# Patient Record
Sex: MALE | Race: WHITE | NOT HISPANIC OR LATINO | Employment: FULL TIME | URBAN - METROPOLITAN AREA
[De-identification: names, ages, dates, MRNs, and addresses within clinical notes are randomized per-mention and may not be internally consistent; named-entity substitution may affect disease eponyms.]

---

## 2021-12-14 ENCOUNTER — TELEPHONE (OUTPATIENT)
Dept: FAMILY MEDICINE CLINIC | Facility: CLINIC | Age: 41
End: 2021-12-14

## 2021-12-14 ENCOUNTER — OFFICE VISIT (OUTPATIENT)
Dept: FAMILY MEDICINE CLINIC | Facility: CLINIC | Age: 41
End: 2021-12-14
Payer: COMMERCIAL

## 2021-12-14 VITALS
BODY MASS INDEX: 29.2 KG/M2 | SYSTOLIC BLOOD PRESSURE: 116 MMHG | WEIGHT: 204 LBS | HEIGHT: 70 IN | HEART RATE: 84 BPM | DIASTOLIC BLOOD PRESSURE: 82 MMHG

## 2021-12-14 DIAGNOSIS — N48.9 PENILE LESION: ICD-10-CM

## 2021-12-14 DIAGNOSIS — Z00.00 HEALTH CARE MAINTENANCE: ICD-10-CM

## 2021-12-14 DIAGNOSIS — Z72.0 NICOTINE ABUSE: Primary | ICD-10-CM

## 2021-12-14 DIAGNOSIS — Z72.0 NICOTINE ABUSE: ICD-10-CM

## 2021-12-14 PROCEDURE — 3008F BODY MASS INDEX DOCD: CPT | Performed by: PHYSICIAN ASSISTANT

## 2021-12-14 PROCEDURE — 3725F SCREEN DEPRESSION PERFORMED: CPT | Performed by: PHYSICIAN ASSISTANT

## 2021-12-14 PROCEDURE — 99204 OFFICE O/P NEW MOD 45 MIN: CPT | Performed by: PHYSICIAN ASSISTANT

## 2021-12-14 RX ORDER — VARENICLINE TARTRATE 25 MG
KIT ORAL
Qty: 53 TABLET | Refills: 0 | Status: SHIPPED | OUTPATIENT
Start: 2021-12-14 | End: 2021-12-15

## 2021-12-15 RX ORDER — VARENICLINE TARTRATE 1 MG/1
1 TABLET, FILM COATED ORAL 2 TIMES DAILY
Qty: 60 TABLET | Refills: 4 | Status: SHIPPED | OUTPATIENT
Start: 2021-12-15

## 2021-12-15 RX ORDER — VARENICLINE TARTRATE
KIT
Qty: 53 EACH | Refills: 0 | Status: SHIPPED | OUTPATIENT
Start: 2021-12-15 | End: 2021-12-15 | Stop reason: ALTCHOICE

## 2021-12-15 RX ORDER — VARENICLINE TARTRATE 1 MG/1
1 TABLET, FILM COATED ORAL 2 TIMES DAILY
Qty: 60 TABLET | Refills: 4 | Status: SHIPPED | OUTPATIENT
Start: 2021-12-15 | End: 2021-12-15

## 2021-12-18 LAB
ALBUMIN SERPL-MCNC: 4.4 G/DL (ref 3.6–5.1)
ALBUMIN/GLOB SERPL: 2 (CALC) (ref 1–2.5)
ALP SERPL-CCNC: 62 U/L (ref 36–130)
ALT SERPL-CCNC: 16 U/L (ref 9–46)
AST SERPL-CCNC: 14 U/L (ref 10–40)
B BURGDOR AB SER IA-ACNC: <0.9 INDEX
BASOPHILS # BLD AUTO: 83 CELLS/UL (ref 0–200)
BASOPHILS NFR BLD AUTO: 0.9 %
BILIRUB SERPL-MCNC: 0.6 MG/DL (ref 0.2–1.2)
BUN SERPL-MCNC: 15 MG/DL (ref 7–25)
BUN/CREAT SERPL: NORMAL (CALC) (ref 6–22)
CALCIUM SERPL-MCNC: 9.4 MG/DL (ref 8.6–10.3)
CHLORIDE SERPL-SCNC: 105 MMOL/L (ref 98–110)
CHOLEST SERPL-MCNC: 213 MG/DL
CHOLEST/HDLC SERPL: 5.3 (CALC)
CO2 SERPL-SCNC: 30 MMOL/L (ref 20–32)
CREAT SERPL-MCNC: 0.8 MG/DL (ref 0.6–1.35)
EOSINOPHIL # BLD AUTO: 147 CELLS/UL (ref 15–500)
EOSINOPHIL NFR BLD AUTO: 1.6 %
ERYTHROCYTE [DISTWIDTH] IN BLOOD BY AUTOMATED COUNT: 12.1 % (ref 11–15)
GLOBULIN SER CALC-MCNC: 2.2 G/DL (CALC) (ref 1.9–3.7)
GLUCOSE SERPL-MCNC: 88 MG/DL (ref 65–99)
HCT VFR BLD AUTO: 44.8 % (ref 38.5–50)
HDLC SERPL-MCNC: 40 MG/DL
HGB BLD-MCNC: 15.5 G/DL (ref 13.2–17.1)
LDLC SERPL CALC-MCNC: 147 MG/DL (CALC)
LYMPHOCYTES # BLD AUTO: 2456 CELLS/UL (ref 850–3900)
LYMPHOCYTES NFR BLD AUTO: 26.7 %
MCH RBC QN AUTO: 30.4 PG (ref 27–33)
MCHC RBC AUTO-ENTMCNC: 34.6 G/DL (ref 32–36)
MCV RBC AUTO: 87.8 FL (ref 80–100)
MONOCYTES # BLD AUTO: 598 CELLS/UL (ref 200–950)
MONOCYTES NFR BLD AUTO: 6.5 %
NEUTROPHILS # BLD AUTO: 5916 CELLS/UL (ref 1500–7800)
NEUTROPHILS NFR BLD AUTO: 64.3 %
NONHDLC SERPL-MCNC: 173 MG/DL (CALC)
PLATELET # BLD AUTO: 283 THOUSAND/UL (ref 140–400)
PMV BLD REES-ECKER: 10.5 FL (ref 7.5–12.5)
POTASSIUM SERPL-SCNC: 4.5 MMOL/L (ref 3.5–5.3)
PROT SERPL-MCNC: 6.6 G/DL (ref 6.1–8.1)
RBC # BLD AUTO: 5.1 MILLION/UL (ref 4.2–5.8)
SL AMB EGFR AFRICAN AMERICAN: 130 ML/MIN/1.73M2
SL AMB EGFR NON AFRICAN AMERICAN: 113 ML/MIN/1.73M2
SODIUM SERPL-SCNC: 141 MMOL/L (ref 135–146)
TRIGL SERPL-MCNC: 137 MG/DL
TSH SERPL-ACNC: 0.94 MIU/L (ref 0.4–4.5)
WBC # BLD AUTO: 9.2 THOUSAND/UL (ref 3.8–10.8)

## 2022-01-27 ENCOUNTER — OFFICE VISIT (OUTPATIENT)
Dept: UROLOGY | Facility: MEDICAL CENTER | Age: 42
End: 2022-01-27
Payer: COMMERCIAL

## 2022-01-27 VITALS
DIASTOLIC BLOOD PRESSURE: 80 MMHG | SYSTOLIC BLOOD PRESSURE: 132 MMHG | WEIGHT: 203 LBS | HEIGHT: 70 IN | BODY MASS INDEX: 29.06 KG/M2

## 2022-01-27 DIAGNOSIS — N48.9 PENILE LESION: ICD-10-CM

## 2022-01-27 PROCEDURE — 99244 OFF/OP CNSLTJ NEW/EST MOD 40: CPT | Performed by: UROLOGY

## 2022-01-27 PROCEDURE — 3008F BODY MASS INDEX DOCD: CPT | Performed by: UROLOGY

## 2022-01-27 RX ORDER — CEFAZOLIN SODIUM 2 G/50ML
2000 SOLUTION INTRAVENOUS ONCE
Status: CANCELLED | OUTPATIENT
Start: 2022-03-16 | End: 2022-01-27

## 2022-01-27 NOTE — PATIENT INSTRUCTIONS
HPV (Human Papillomavirus)   AMBULATORY CARE:   Human Papillomavirus (HPV)  is the most common infection spread by sexual contact  It can also be spread from a mother to her baby during delivery  HPV may cause oral and genital warts or tumors in your nose, mouth, throat, and lungs  HPV may also cause vaginal, penile, and anal cancers  You may not show symptoms of any of these conditions for several years after being exposed to HPV  Common symptoms include the following:   · Painless warts    · Genital or anal discharge, bleeding, itching, or pain    · Pain when you urinate    Call your doctor if:   · You have warts in your genital or anal area  · You have genital or anal discharge, bleeding, itching, or pain  · You have pain when you urinate  · You have questions or concerns about your condition or care  HPV diagnosis:  Your healthcare provider may use a vinegar liquid to help diagnose HPV genital warts  Women 27to 72years old can be checked for HPV during regular cervical cancer screenings  An HPV test checks for certain types of HPV that can cause changes in cervical cells  Without treatment, the changed cells can become cancer  An HPV test can be done every 5 years if the results show no infection  The test can be done with or without a Pap smear  A Pap smear checks for cancer or for abnormal cells that can become cancer  You may be tested for HPV if you are diagnosed with a mouth or throat cancer  Treatment:  HPV cannot be cured  Conditions caused by HPV can be treated  You will need to be monitored closely for these conditions  Ask your healthcare provider for more information about monitoring, conditions caused by HPV, and available treatments  Prevent HPV infection:   · Ask about the HPV vaccine  The vaccine can help protect against HPV infection  Females and males can receive the vaccine  It is most effective if given before sexual activity begins   This allows the body to build almost complete protection against HPV before contact with the virus  The vaccine is usually given at 6or 15years of age but may be given as early as 5 years  The vaccine can be given through age 39  · Always use a condom during intercourse  A condom will not completely protect you from HPV infection, but it will help lower your risk  Use a new condom or latex barrier each time you have sex  This includes oral, vaginal, and anal sex  Make sure the condom fits and is put on correctly  Rubber latex sheets or dental dams can be used for oral sex  If you are allergic to latex, use a nonlatex product such as polyurethane  Follow up with your healthcare provider as directed:  Write down your questions so you remember to ask them during your visits  © Copyright [employer] 2021 Information is for End User's use only and may not be sold, redistributed or otherwise used for commercial purposes  All illustrations and images included in CareNotes® are the copyrighted property of A D A M , Inc  or [facility][note 3] Vivek Nino   The above information is an  only  It is not intended as medical advice for individual conditions or treatments  Talk to your doctor, nurse or pharmacist before following any medical regimen to see if it is safe and effective for you

## 2022-01-27 NOTE — PROGRESS NOTES
UROLOGY NEW CONSULT NOTE     CHIEF COMPLAINT   Peterson España is a 46 y o  male with a complaint of   Chief Complaint   Patient presents with    Penile Lesion       History of Present Illness:     46 y o  male who began to notice a penile lesion around age 23  Recently this has grown in size and has become somewhat unsightly  There was a raised area approximately 2 x 2 cm on the right penile shaft  Patient attempted apple cider vinegar treatments which did not reduce the size of the lesion  Presents today for discussion    Past Medical History:   History reviewed  No pertinent past medical history  PAST SURGICAL HISTORY:     Past Surgical History:   Procedure Laterality Date    KNEE SURGERY         CURRENT MEDICATIONS:     Current Outpatient Medications   Medication Sig Dispense Refill    varenicline (CHANTIX) 1 mg tablet Take 1 tablet (1 mg total) by mouth 2 (two) times a day 60 tablet 4     No current facility-administered medications for this visit         ALLERGIES:   No Known Allergies    SOCIAL HISTORY:     Social History     Socioeconomic History    Marital status: /Civil Union     Spouse name: None    Number of children: None    Years of education: None    Highest education level: None   Occupational History    None   Tobacco Use    Smoking status: Current Every Day Smoker     Packs/day: 0 50     Years: 10 00     Pack years: 5 00     Types: Cigarettes    Smokeless tobacco: Never Used   Vaping Use    Vaping Use: Every day    Substances: Nicotine, THC, Flavoring   Substance and Sexual Activity    Alcohol use: Yes     Comment: social    Drug use: Not Currently    Sexual activity: Yes   Other Topics Concern    None   Social History Narrative    None     Social Determinants of Health     Financial Resource Strain: Not on file   Food Insecurity: Not on file   Transportation Needs: Not on file   Physical Activity: Not on file   Stress: Not on file   Social Connections: Not on file Intimate Partner Violence: Not on file   Housing Stability: Not on file       SOCIAL HISTORY:     Family History   Problem Relation Age of Onset    Breast cancer additional onset Mother     Hyperlipidemia Father     Hypertension Father     Heart disease Maternal Grandmother     Stomach cancer Paternal Grandmother        REVIEW OF SYSTEMS:     Review of Systems   Constitutional: Negative  Respiratory: Negative  Cardiovascular: Negative  Gastrointestinal: Negative  Genitourinary: Negative  Musculoskeletal: Negative  Skin: Negative  Psychiatric/Behavioral: Negative  PHYSICAL EXAM:     /80   Ht 5' 10" (1 778 m)   Wt 92 1 kg (203 lb)   BMI 29 13 kg/m²     General:  Healthy appearing male in no acute distress  They have a normal affect  There is not appear to be any gross neurologic defects or abnormalities  HEENT:  Normocephalic, atraumatic  Neck is supple without any palpable lymphadenopathy  Cardiovascular:  Patient has normal palpable distal radial pulses  There is no significant peripheral edema  No JVD is noted  Respiratory:  Patient has unlabored respirations  There is no audible wheeze or rhonchi  Abdomen:  Abdomen is without surgical scars  Abdomen is soft and nontender  There is no tympany  Inguinal and umbilical hernia are not appreciated  Genitourinary:  Circumcised phallus, orthotopic meatus, testicles are smooth and descended, there is a 2 x 2 cm mid shaft area of condyloma which is slightly raised, testicles are smooth and descended in the scrotum there is a small skin tag on the scrotum on the right posterior surface    Musculoskeletal:  Patient does not have significant CVA tenderness in the  flank with palpation or percussion  They full range of motion in all 4 extremities  Strength in all 4 extremities appears congruent  Patient is able to ambulate without assistance or difficulty    Dermatologic:  Patient has no skin abnormalities or rashes  LABS:     CBC:   Lab Results   Component Value Date    WBC 9 2 12/17/2021    HGB 15 5 12/17/2021    HCT 44 8 12/17/2021    MCV 87 8 12/17/2021     12/17/2021       BMP:   Lab Results   Component Value Date    CALCIUM 9 4 12/17/2021    K 4 5 12/17/2021    CO2 30 12/17/2021     12/17/2021    BUN 15 12/17/2021    CREATININE 0 80 12/17/2021       ASSESSMENT:     46 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, penile condyloma and scrotal skin tag    PLAN:     I discussed with the patient that the lesion on his 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  Options for treatment include topical Podofilux versus excision and CO2 laser ablation in the operating room  Laser ablation is likely the most effective at removal and decreased recurrence  Patient understands that the HPV virus lays dormant and recurrences are possible  He prefers excision in the operating room  At that time, we will also remove the scrotal skin tag  Both will be sent for pathology  We will utilize a CO2 laser to ablate the base  Risks and benefits including bleeding and infection, recurrence of the lesion, poor cosmesis and change to sensation were all discussed    Patient will sign consent the day of the procedure

## 2022-01-28 ENCOUNTER — TELEPHONE (OUTPATIENT)
Dept: UROLOGY | Facility: CLINIC | Age: 42
End: 2022-01-28

## 2022-01-28 NOTE — TELEPHONE ENCOUNTER
Called patient and left a voicemail for patient to call me back to schedule their surgery  Patient to call me at [phone]

## 2022-02-02 NOTE — TELEPHONE ENCOUNTER
Called patient and left a voicemail for patient to call me back to schedule their surgery  Patient to call me at [phone]

## 2022-02-03 ENCOUNTER — PREP FOR PROCEDURE (OUTPATIENT)
Dept: UROLOGY | Facility: CLINIC | Age: 42
End: 2022-02-03

## 2022-02-03 DIAGNOSIS — N48.9 PENILE LESION: Primary | ICD-10-CM

## 2022-02-03 NOTE — TELEPHONE ENCOUNTER
Called and spoke with patient to schedule sx  Patient informed of all PAT's needed prior to sx and advised to stop any anticoagulant medication including Multi-vitamins, Fish oil, Krill oil and NSAID, Patient has also been inform that the hospital will call the day before sx with arrival time and procedure time   Patient also informed to have a  bring them to and from hospital     Sx Date: 03/16  Location: [facility]  Physician: Nithya Whatley  H&P Date:  On admit  Clearance Date:   Consent: on admit  Pre-cert Added to  list on : 02/03    PAT's Ordered to be done on: cbc/ bmp/ ucx 03/02/2022

## 2022-03-02 ENCOUNTER — APPOINTMENT (OUTPATIENT)
Dept: LAB | Facility: CLINIC | Age: 42
End: 2022-03-02
Payer: COMMERCIAL

## 2022-03-02 DIAGNOSIS — N48.9 PENILE LESION: ICD-10-CM

## 2022-03-02 LAB
ANION GAP SERPL CALCULATED.3IONS-SCNC: 6 MMOL/L (ref 4–13)
BASOPHILS # BLD AUTO: 0.08 THOUSANDS/ΜL (ref 0–0.1)
BASOPHILS NFR BLD AUTO: 1 % (ref 0–1)
BUN SERPL-MCNC: 16 MG/DL (ref 5–25)
CALCIUM SERPL-MCNC: 9 MG/DL (ref 8.3–10.1)
CHLORIDE SERPL-SCNC: 107 MMOL/L (ref 100–108)
CO2 SERPL-SCNC: 27 MMOL/L (ref 21–32)
CREAT SERPL-MCNC: 0.8 MG/DL (ref 0.6–1.3)
EOSINOPHIL # BLD AUTO: 0.11 THOUSAND/ΜL (ref 0–0.61)
EOSINOPHIL NFR BLD AUTO: 1 % (ref 0–6)
ERYTHROCYTE [DISTWIDTH] IN BLOOD BY AUTOMATED COUNT: 12 % (ref 11.6–15.1)
GFR SERPL CREATININE-BSD FRML MDRD: 112 ML/MIN/1.73SQ M
GLUCOSE P FAST SERPL-MCNC: 95 MG/DL (ref 65–99)
HCT VFR BLD AUTO: 47.6 % (ref 36.5–49.3)
HGB BLD-MCNC: 15.8 G/DL (ref 12–17)
IMM GRANULOCYTES # BLD AUTO: 0.02 THOUSAND/UL (ref 0–0.2)
IMM GRANULOCYTES NFR BLD AUTO: 0 % (ref 0–2)
LYMPHOCYTES # BLD AUTO: 2.59 THOUSANDS/ΜL (ref 0.6–4.47)
LYMPHOCYTES NFR BLD AUTO: 25 % (ref 14–44)
MCH RBC QN AUTO: 30 PG (ref 26.8–34.3)
MCHC RBC AUTO-ENTMCNC: 33.2 G/DL (ref 31.4–37.4)
MCV RBC AUTO: 91 FL (ref 82–98)
MONOCYTES # BLD AUTO: 0.71 THOUSAND/ΜL (ref 0.17–1.22)
MONOCYTES NFR BLD AUTO: 7 % (ref 4–12)
NEUTROPHILS # BLD AUTO: 6.7 THOUSANDS/ΜL (ref 1.85–7.62)
NEUTS SEG NFR BLD AUTO: 66 % (ref 43–75)
NRBC BLD AUTO-RTO: 0 /100 WBCS
PLATELET # BLD AUTO: 270 THOUSANDS/UL (ref 149–390)
PMV BLD AUTO: 10.2 FL (ref 8.9–12.7)
POTASSIUM SERPL-SCNC: 3.8 MMOL/L (ref 3.5–5.3)
RBC # BLD AUTO: 5.26 MILLION/UL (ref 3.88–5.62)
SODIUM SERPL-SCNC: 140 MMOL/L (ref 136–145)
WBC # BLD AUTO: 10.21 THOUSAND/UL (ref 4.31–10.16)

## 2022-03-02 PROCEDURE — 80048 BASIC METABOLIC PNL TOTAL CA: CPT

## 2022-03-02 PROCEDURE — 87086 URINE CULTURE/COLONY COUNT: CPT

## 2022-03-02 PROCEDURE — 85025 COMPLETE CBC W/AUTO DIFF WBC: CPT

## 2022-03-02 PROCEDURE — 36415 COLL VENOUS BLD VENIPUNCTURE: CPT

## 2022-03-03 ENCOUNTER — TELEPHONE (OUTPATIENT)
Dept: UROLOGY | Facility: CLINIC | Age: 42
End: 2022-03-03

## 2022-03-03 ENCOUNTER — TELEPHONE (OUTPATIENT)
Dept: UROLOGY | Facility: AMBULATORY SURGERY CENTER | Age: 42
End: 2022-03-03

## 2022-03-03 LAB — BACTERIA UR CULT: NORMAL

## 2022-03-03 NOTE — TELEPHONE ENCOUNTER
Hi Dr Powell Hearing,    I just received this message from [facility] our Prior authorization specialist     Good morning Loretta,    [employer] denied auth for procedure it is not medically necessary for patient to have procedure done in a facility  Notes do not state why patient is scheduled in Hospital   Procedure should be done in office per insurance  Let me know what you decide  Thanks,    [facility]      Can you please inform why this would need to be done in the OR to see if we can appeal or should we just schedule it in the office?     Please review and advise

## 2022-03-08 ENCOUNTER — TELEPHONE (OUTPATIENT)
Dept: OTHER | Facility: OTHER | Age: 42
End: 2022-03-08

## 2022-03-08 NOTE — TELEPHONE ENCOUNTER
Called patient and left message stating that we are going to appeal this denial and we will be in contact with appeal decision  If he need to reach me I can be reached at [phone]

## 2022-03-09 NOTE — TELEPHONE ENCOUNTER
Ciro Murrell/ Jacqueline Caraballo    This patient case has been denied and will need a peer to peer, would either of you be able to help with this? If so can you please give me a date and time and Direct phone number I can have an agent call you? Patient  Is scheduled for sx with Dr Zack Bruce on 03/16      Thank you in advance

## 2022-03-09 NOTE — TELEPHONE ENCOUNTER
I can do the peer to peer Monday 3/14 anytime between 12:30-4 pm  Have them call my cell direct [phone]

## 2022-03-10 RX ORDER — AMOXICILLIN 500 MG/1
CAPSULE ORAL
COMMUNITY
Start: 2022-02-17 | End: 2022-03-16 | Stop reason: HOSPADM

## 2022-03-10 RX ORDER — IBUPROFEN 200 MG
TABLET ORAL EVERY 6 HOURS PRN
COMMUNITY

## 2022-03-10 NOTE — PRE-PROCEDURE INSTRUCTIONS
Pre-Surgery Instructions:   Medication Instructions    ibuprofen (MOTRIN) 200 mg tablet Instructed patient per Anesthesia Guidelines  Stop 3/13    varenicline (CHANTIX) 1 mg tablet take morning of surgery with sip water     Covid screening negative as per patient  Fully vaccinated  Reviewed pre op medicine and showering instructions with patient via phone call, verbalizes understanding  Advised patient to stop taking non prescribed vitamins, herbal meds and ASA as of 3/10  Advised to stop taking NSAID's 3 days pre op but may take Tylenol products if needed  Advised NPO after MN (3/15) and surgical services will call (3/15) with scheduled time of hospital arrival     Advised to abstain from smoking/vaping 24 hrs pre op  Patient currently taking Chantix

## 2022-03-15 ENCOUNTER — ANESTHESIA EVENT (OUTPATIENT)
Dept: PERIOP | Facility: HOSPITAL | Age: 42
End: 2022-03-15
Payer: COMMERCIAL

## 2022-03-16 ENCOUNTER — ANESTHESIA (OUTPATIENT)
Dept: PERIOP | Facility: HOSPITAL | Age: 42
End: 2022-03-16
Payer: COMMERCIAL

## 2022-03-16 ENCOUNTER — HOSPITAL ENCOUNTER (OUTPATIENT)
Facility: HOSPITAL | Age: 42
Setting detail: OUTPATIENT SURGERY
Discharge: HOME | End: 2022-03-16
Attending: UROLOGY | Admitting: UROLOGY
Payer: COMMERCIAL

## 2022-03-16 VITALS
OXYGEN SATURATION: 100 % | TEMPERATURE: 97.8 F | DIASTOLIC BLOOD PRESSURE: 87 MMHG | BODY MASS INDEX: 29.77 KG/M2 | HEART RATE: 53 BPM | SYSTOLIC BLOOD PRESSURE: 114 MMHG | RESPIRATION RATE: 16 BRPM | WEIGHT: 207.45 LBS

## 2022-03-16 DIAGNOSIS — N48.9 PENILE LESION: ICD-10-CM

## 2022-03-16 PROBLEM — Z72.0 CURRENTLY ATTEMPTING TO QUIT SMOKING: Status: ACTIVE | Noted: 2022-03-16

## 2022-03-16 PROCEDURE — 11200 RMVL SKIN TAGS UP TO&INC 15: CPT | Performed by: UROLOGY

## 2022-03-16 PROCEDURE — 54065 DESTRUCTION PENIS LESION(S): CPT | Performed by: UROLOGY

## 2022-03-16 PROCEDURE — 88304 TISSUE EXAM BY PATHOLOGIST: CPT | Performed by: SPECIALIST

## 2022-03-16 PROCEDURE — NC001 PR NO CHARGE: Performed by: UROLOGY

## 2022-03-16 RX ORDER — GINSENG 100 MG
CAPSULE ORAL AS NEEDED
Status: DISCONTINUED | OUTPATIENT
Start: 2022-03-16 | End: 2022-03-16 | Stop reason: HOSPADM

## 2022-03-16 RX ORDER — FENTANYL CITRATE 50 UG/ML
INJECTION, SOLUTION INTRAMUSCULAR; INTRAVENOUS AS NEEDED
Status: DISCONTINUED | OUTPATIENT
Start: 2022-03-16 | End: 2022-03-16

## 2022-03-16 RX ORDER — HYDROCODONE BITARTRATE AND ACETAMINOPHEN 5; 325 MG/1; MG/1
1 TABLET ORAL EVERY 6 HOURS PRN
Status: DISCONTINUED | OUTPATIENT
Start: 2022-03-16 | End: 2022-03-16 | Stop reason: HOSPADM

## 2022-03-16 RX ORDER — SODIUM CHLORIDE 9 MG/ML
125 INJECTION, SOLUTION INTRAVENOUS CONTINUOUS
Status: DISCONTINUED | OUTPATIENT
Start: 2022-03-16 | End: 2022-03-16 | Stop reason: HOSPADM

## 2022-03-16 RX ORDER — MIDAZOLAM HYDROCHLORIDE 2 MG/2ML
INJECTION, SOLUTION INTRAMUSCULAR; INTRAVENOUS AS NEEDED
Status: DISCONTINUED | OUTPATIENT
Start: 2022-03-16 | End: 2022-03-16

## 2022-03-16 RX ORDER — ONDANSETRON 2 MG/ML
4 INJECTION INTRAMUSCULAR; INTRAVENOUS ONCE AS NEEDED
Status: DISCONTINUED | OUTPATIENT
Start: 2022-03-16 | End: 2022-03-16 | Stop reason: HOSPADM

## 2022-03-16 RX ORDER — BUPIVACAINE HYDROCHLORIDE 5 MG/ML
INJECTION, SOLUTION EPIDURAL; INTRACAUDAL AS NEEDED
Status: DISCONTINUED | OUTPATIENT
Start: 2022-03-16 | End: 2022-03-16 | Stop reason: HOSPADM

## 2022-03-16 RX ORDER — CEFAZOLIN SODIUM 2 G/50ML
2000 SOLUTION INTRAVENOUS ONCE
Status: COMPLETED | OUTPATIENT
Start: 2022-03-16 | End: 2022-03-16

## 2022-03-16 RX ORDER — LIDOCAINE HYDROCHLORIDE 20 MG/ML
INJECTION, SOLUTION EPIDURAL; INFILTRATION; INTRACAUDAL; PERINEURAL AS NEEDED
Status: DISCONTINUED | OUTPATIENT
Start: 2022-03-16 | End: 2022-03-16

## 2022-03-16 RX ORDER — ONDANSETRON 2 MG/ML
INJECTION INTRAMUSCULAR; INTRAVENOUS AS NEEDED
Status: DISCONTINUED | OUTPATIENT
Start: 2022-03-16 | End: 2022-03-16

## 2022-03-16 RX ORDER — PROPOFOL 10 MG/ML
INJECTION, EMULSION INTRAVENOUS AS NEEDED
Status: DISCONTINUED | OUTPATIENT
Start: 2022-03-16 | End: 2022-03-16

## 2022-03-16 RX ORDER — FENTANYL CITRATE/PF 50 MCG/ML
25 SYRINGE (ML) INJECTION
Status: DISCONTINUED | OUTPATIENT
Start: 2022-03-16 | End: 2022-03-16 | Stop reason: HOSPADM

## 2022-03-16 RX ADMIN — CEFAZOLIN SODIUM 2000 MG: 2 SOLUTION INTRAVENOUS at 15:50

## 2022-03-16 RX ADMIN — SODIUM CHLORIDE 125 ML/HR: 9 INJECTION, SOLUTION INTRAVENOUS at 12:04

## 2022-03-16 RX ADMIN — PROPOFOL 200 MG: 10 INJECTION, EMULSION INTRAVENOUS at 15:48

## 2022-03-16 RX ADMIN — FENTANYL CITRATE 50 MCG: 50 INJECTION INTRAMUSCULAR; INTRAVENOUS at 15:48

## 2022-03-16 RX ADMIN — SODIUM CHLORIDE 125 ML/HR: 9 INJECTION, SOLUTION INTRAVENOUS at 16:55

## 2022-03-16 RX ADMIN — FENTANYL CITRATE 50 MCG: 50 INJECTION INTRAMUSCULAR; INTRAVENOUS at 15:55

## 2022-03-16 RX ADMIN — LIDOCAINE HYDROCHLORIDE 100 MG: 20 INJECTION, SOLUTION EPIDURAL; INFILTRATION; INTRACAUDAL; PERINEURAL at 15:48

## 2022-03-16 RX ADMIN — ONDANSETRON 4 MG: 2 INJECTION INTRAMUSCULAR; INTRAVENOUS at 16:10

## 2022-03-16 RX ADMIN — MIDAZOLAM 2 MG: 1 INJECTION INTRAMUSCULAR; INTRAVENOUS at 15:43

## 2022-03-16 NOTE — ANESTHESIA PREPROCEDURE EVALUATION
Procedure:  EXCISION CONDYLOMA PERINEAL (PENILE/VAGINAL) WITH LASER CO2, removal scrotal skin tag (N/A Perineum)    Relevant Problems   Other   (+) Penile lesion   (+) Vapes nicotine containing substance        Physical Exam    Airway    Mallampati score: II  TM Distance: >3 FB  Neck ROM: full     Dental       Cardiovascular  Rhythm: irregular, Rate: normal, Cardiovascular exam normal    Pulmonary  Pulmonary exam normal Breath sounds clear to auscultation,     Other Findings        Anesthesia Plan  ASA Score- 2     Anesthesia Type- general with ASA Monitors  Additional Monitors:   Airway Plan: LMA  Plan Factors-    Patient summary reviewed  Patient is a current smoker  Patient instructed to abstain from smoking on day of procedure  Patient smoked on day of surgery  Obstructive sleep apnea risk education given perioperatively  Induction- intravenous  Postoperative Plan- Plan for postoperative opioid use  Informed Consent- Anesthetic plan and risks discussed with patient

## 2022-03-16 NOTE — H&P
UROLOGY PREOPERATIVE H&P NOTE     CHIEF COMPLAINT   Sanket Gomez is a 46 y o  male with a complaint of   No chief complaint on file  History of Present Illness:     46 y o  male who began to notice a penile lesion around age 23  Recently this has grown in size and has become somewhat unsightly  There was a raised area approximately 2 x 2 cm on the right penile shaft  Patient attempted apple cider vinegar treatments which did not reduce the size of the lesion  After discussion in the office, patient presents today for surgical excision with laser ablation and removal of scrotal skin tag    Past Medical History:   History reviewed  No pertinent past medical history      PAST SURGICAL HISTORY:     Past Surgical History:   Procedure Laterality Date    KNEE ARTHROSCOPY      KNEE SURGERY      x 2-arthroscopy       CURRENT MEDICATIONS:     Current Facility-Administered Medications   Medication Dose Route Frequency Provider Last Rate Last Admin    ceFAZolin (ANCEF) IVPB (premix in dextrose) 2,000 mg 50 mL  2,000 mg Intravenous Once Chivo Lyon MD        sodium chloride 0 9 % infusion  125 mL/hr Intravenous Continuous Allegra DO Kimberley 125 mL/hr at 03/16/22 1204 125 mL/hr at 03/16/22 1204       ALLERGIES:   No Known Allergies    SOCIAL HISTORY:     Social History     Socioeconomic History    Marital status: /Civil Union     Spouse name: None    Number of children: None    Years of education: None    Highest education level: None   Occupational History    None   Tobacco Use    Smoking status: Current Every Day Smoker     Packs/day: 0 50     Years: 10 00     Pack years: 5 00     Types: Cigarettes    Smokeless tobacco: Never Used    Tobacco comment: 6131    Vaping Use    Vaping Use: Every day    Substances: Nicotine, THC   Substance and Sexual Activity    Alcohol use: Yes     Comment: social    Drug use: Yes     Types: Marijuana     Comment: smokes daily 0700 vaping     Sexual activity: Yes   Other Topics Concern    None   Social History Narrative    None     Social Determinants of Health     Financial Resource Strain: Not on file   Food Insecurity: Not on file   Transportation Needs: Not on file   Physical Activity: Not on file   Stress: Not on file   Social Connections: Not on file   Intimate Partner Violence: Not on file   Housing Stability: Not on file       SOCIAL HISTORY:     Family History   Problem Relation Age of Onset    Breast cancer additional onset Mother     Hyperlipidemia Father     Hypertension Father     Heart disease Maternal Grandmother     Stomach cancer Paternal Grandmother        REVIEW OF SYSTEMS:     Review of Systems   Constitutional: Negative  Respiratory: Negative  Cardiovascular: Negative  Gastrointestinal: Negative  Genitourinary: Negative  Musculoskeletal: Negative  Skin: Negative  Psychiatric/Behavioral: Negative  PHYSICAL EXAM:     /80   Pulse 68   Temp 98 8 °F (37 1 °C) (Temporal)   Resp 16   Wt 94 1 kg (207 lb 7 3 oz)   SpO2 97%   BMI 29 77 kg/m²     General:  Healthy appearing male in no acute distress  They have a normal affect  There is not appear to be any gross neurologic defects or abnormalities  HEENT:  Normocephalic, atraumatic  Neck is supple without any palpable lymphadenopathy  Cardiovascular:  Patient has normal palpable distal radial pulses  There is no significant peripheral edema  No JVD is noted  Respiratory:  Patient has unlabored respirations  There is no audible wheeze or rhonchi  Abdomen:  Abdomen is without surgical scars  Abdomen is soft and nontender  There is no tympany  Inguinal and umbilical hernia are not appreciated      Genitourinary:  Circumcised phallus, orthotopic meatus, testicles are smooth and descended, there is a 2 x 2 cm mid shaft area of condyloma which is slightly raised, testicles are smooth and descended in the scrotum there is a small skin tag on the scrotum on the right posterior surface    Musculoskeletal:  Patient does not have significant CVA tenderness in the  flank with palpation or percussion  They full range of motion in all 4 extremities  Strength in all 4 extremities appears congruent  Patient is able to ambulate without assistance or difficulty  Dermatologic:  Patient has no skin abnormalities or rashes  LABS:     CBC:   Lab Results   Component Value Date    WBC 10 21 (H) 03/02/2022    HGB 15 8 03/02/2022    HCT 47 6 03/02/2022    MCV 91 03/02/2022     03/02/2022       BMP:   Lab Results   Component Value Date    CALCIUM 9 0 03/02/2022    K 3 8 03/02/2022    CO2 27 03/02/2022     03/02/2022    BUN 16 03/02/2022    CREATININE 0 80 03/02/2022       ASSESSMENT:     46 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, penile condyloma and scrotal skin tag    PLAN:     I discussed with the patient that the lesion on his 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  Options for treatment include topical Podofilux versus excision and CO2 laser ablation in the operating room  Laser ablation is likely the most effective at removal and decreased recurrence  Patient understands that the HPV virus lays dormant and recurrences are possible  He prefers excision in the operating room  At that time, we will also remove the scrotal skin tag  Both will be sent for pathology  We will utilize a CO2 laser to ablate the base  Risks and benefits including bleeding and infection, recurrence of the lesion, poor cosmesis and change to sensation were all discussed  Informed consent signed today

## 2022-03-16 NOTE — OP NOTE
OPERATIVE REPORT  PATIENT NAME: Jose Antonio Dunn    :  1980  MRN: [id number]  Pt Location: [facility] OR ROOM 04    SURGERY DATE: 3/16/2022    Surgeon(s) and Role:     * Latanya Escobar MD - Primary    Preop Diagnosis:  Penile lesion [N48 9]    Post-Op Diagnosis Codes:     * Penile lesion [N48 9]    Procedure(s) (LRB):  EXCISION CONDYLOMA PERINEAL (PENILE/VAGINAL) WITH LASER CO2, removal scrotal skin tag (N/A)    Specimen(s):  ID Type Source Tests Collected by Time Destination   1 : scrotal lesion Tissue Penis TISSUE EXAM Latanya Escobar MD 3/16/2022 1559    2 : penile lesion Tissue Penis TISSUE EXAM Latanya Escobar MD 3/16/2022 1600        Estimated Blood Loss:   Minimal    Drains:  * No LDAs found *    Anesthesia Type:   Choice    Operative Indications:  Penile lesion [N48 9]      Operative Findings:  1  2 cm right lateral mid shaft condyloma excised and laser ablated at the base with skin approximation with 3-0 chromic  2  Small right posterior scrotal skin tag, 1 cm in size, excised    Complications:   None    Procedure and Technique:  Jose Antonio Dunn is a 46 y o  male with a history of penile lesion and scrotal skin tag  Prior discussion in the office and the patient agreed to proceed with operative excision, laser ablation and repair  The patient was counseled regarding their options and ultimately opted to proceed with procedure as above  Risks and benefits of the procedure were described and the patient signed an informed consent  On 3/16/2022 , the patient proceeded to the operating room  They were laid supine on the operating room table and a pre-procedure time out was performed with all parties present and in agreement of the procedure planned and laterality  Patient received intravenous antibiotics in the form of Ancef and sequential compression devices were placed on bilateral lower extremities  They were then induced with a LMA anesthetic      The area around the penile lesion and scrotal skin tag were shaved  Patient was prepped with ChloraPrep solution  After appropriate drying, he was draped with sterile saline soaked OR towels and drapes  10 cc of 0 5% Marcaine plain were instilled under the penile condyloma and scrotal skin tag  15  Blade was used to circumscribe both lesions and sent them off separately for pathology  The CO2 laser was then used to ablate the entire circumferential base of the penile condyloma on the lateral surface of the penile shaft  At the stalk, there was some ongoing bleeding and so a figure-of-eight suture with 3-0 chromic was placed to control and obtain good hemostasis  Skin was then approximated with a running 3-0 chromic suture  Horizontal mattress with the 3-0 chromic was performed at the base of the 1 cm scrotal skin tag on the right  There was good hemostasis here as well  Bacitracin ointment was placed over both incisions  At the completion of the procedure, the patient was extubated and transferred to PACU in good condition         Patient Disposition:  PACU       SIGNATURE: Raudel Rubin MD  DATE: March 16, 2022  TIME: 4:18 PM

## 2022-03-16 NOTE — DISCHARGE INSTRUCTIONS
Genital Warts   WHAT YOU NEED TO KNOW:   Genital warts are a sexually transmitted infection (STI) caused by the human papillomavirus (HPV)  Genital warts are growths that appear in or on the penis, vagina, or anus  Genital warts are spread during genital, anal, or oral sex  A woman can also pass them to a baby when she gives birth  DISCHARGE INSTRUCTIONS:   Contact your healthcare provider if:   · Your genital warts return  · The skin that is being treated for genital warts is very painful or swollen  · You see or feel new warts on another part of your body  · You have questions or concerns about your condition or care  Medicines:   · Immunomodulators  help strengthen your immune system and treat genital warts  · Antiproliferatives  help stop genital warts from growing in size or increasing in number  · Antivirals  help control and stop virus growth, such as HPV  · Take your medicine as directed  Contact your healthcare provider if you think your medicine is not helping or if you have side effects  Tell him or her if you are allergic to any medicine  Keep a list of the medicines, vitamins, and herbs you take  Include the amounts, and when and why you take them  Bring the list or the pill bottles to follow-up visits  Carry your medicine list with you in case of an emergency  Self-care:   · Do not touch or scratch the warts  This can cause the infection to spread to other parts of your body  · Do not have sex while you are being treated for genital warts  Medicine used on your skin weakens condoms and diaphragms  You also risk spreading genital warts to your partner  · Get regular Pap smears  If you are a woman, this can help diagnose HPV and prevent the spread of the virus  Prevent genital warts:   · Tell your sexual partners that you are being treated for genital warts  They may also be infected and need treatment  · Get the HPV vaccine    The HPV vaccine is given at 9 to 32 years of age to help prevent cervical cancer and genital warts  Ask your healthcare provider for more information about this vaccine  Follow up with your doctor as directed:  Write down your questions so you remember to ask them during your visits  © Copyright [employer] 2022 Information is for End User's use only and may not be sold, redistributed or otherwise used for commercial purposes  All illustrations and images included in CareNotes® are the copyrighted property of A D A M , Inc  or [facility] Vivek Nino   The above information is an  only  It is not intended as medical advice for individual conditions or treatments  Talk to your doctor, nurse or pharmacist before following any medical regimen to see if it is safe and effective for you

## 2022-03-21 ENCOUNTER — NURSE TRIAGE (OUTPATIENT)
Dept: OTHER | Facility: OTHER | Age: 42
End: 2022-03-21

## 2022-03-21 NOTE — TELEPHONE ENCOUNTER
Patient called to report that his stitches started becoming loose and coming out yesterday  It seems to have got stuck on his pants and got tugged on when he went to go to the bathroom  Patient has mild pain and there is no bleeding, redness, or drainage noted  He would like a call back with next step care advice  Reason for Disposition   Suture or staple came out early and caller wants wound checked    Answer Assessment - Initial Assessment Questions  1  SYMPTOM: "What's the main symptom you're concerned about?" (e g , redness, pain, drainage)      Stitches came out early   2  ONSET: "When did symptoms start?"      3/20/22  3  SURGERY: "What surgery was performed?"      EXCISION CONDYLOMA PERINEAL   4  DATE of SURGERY: "When was surgery performed?"       3/16/22  5  INCISION SITE: "Where is the incision located?"       Penis   6  REDNESS: "Is there any redness at the incision site?" If yes, ask: "How wide across is the redness?" (Inches, centimeters)       Denies   7  PAIN: "Is there any pain?" If Yes, ask: "How bad is it?"  (Scale 1-10; or mild, moderate, severe)      Mild  8  BLEEDING: "Is there any bleeding?" If Yes, ask: "How much?" and "Where?"      Denies   9  DRAINAGE: "Is there any drainage from the incision site?" If yes, ask: "What color and how much?" (e g , red, cloudy, pus; drops, teaspoon)      Denies  10  FEVER: "Do you have a fever?" If Yes, ask: "What is your temperature, how was it measured, and when did it start?"        Denies  11   OTHER SYMPTOMS: "Do you have any other symptoms?" (e g , shaking chills, weakness, rash elsewhere on body)        Denies    Protocols used: POST-OP INCISION SYMPTOMS AND QUESTIONS-ADULT-[place]

## 2022-03-21 NOTE — TELEPHONE ENCOUNTER
Spoke with patient regarding stitches coming loose from most recent surgery 3/16/22  Advised patient of MACKENZIE MCMAHON comments and recommendations from previous encounter  Pt with no further questions

## 2023-01-23 ENCOUNTER — OFFICE VISIT (OUTPATIENT)
Dept: FAMILY MEDICINE CLINIC | Facility: CLINIC | Age: 43
End: 2023-01-23

## 2023-01-23 VITALS
WEIGHT: 212 LBS | SYSTOLIC BLOOD PRESSURE: 128 MMHG | RESPIRATION RATE: 16 BRPM | HEIGHT: 70 IN | HEART RATE: 88 BPM | TEMPERATURE: 97.7 F | BODY MASS INDEX: 30.35 KG/M2 | DIASTOLIC BLOOD PRESSURE: 82 MMHG | OXYGEN SATURATION: 98 %

## 2023-01-23 DIAGNOSIS — Z02.1 PRE-EMPLOYMENT EXAMINATION: Primary | ICD-10-CM

## 2023-01-23 NOTE — PROGRESS NOTES
Patient Name:  Dinesh Ortiz   :  1980   MRN:  [id number]   CSN:  [id number]     Subjective:   REASON FOR VISIT:    Chief Complaint   Patient presents with   • Physical Exam        HISTORY OF PRESENT ILLNESS:     Darren Savage is a 38 y o  male who presents today for pre-employment physical    He is going to be working at [employer]  He is doing electronic assembling  He is an intern at the job for 6 months and has been performing his job without difficulty  He denies any concerns such as chest pain, cough, shortness of breath, dizziness, headache, visual concerns  He denies regular recreational drug use  Patient admits that sometimes he will use marijuana if anxious or can't sleep, but has not done anything in a while  PAST MEDICAL HISTORY:   History reviewed  No pertinent past medical history       PAST SURGICAL HISTORY:   Past Surgical History:   Procedure Laterality Date   • KNEE ARTHROSCOPY     • KNEE SURGERY      x 2-arthroscopy   • LASER ABLATION OF CONDYLOMAS N/A 3/16/2022    Procedure: EXCISION CONDYLOMA PERINEAL (PENILE/VAGINAL) WITH LASER CO2, removal scrotal skin tag;  Surgeon: Hector Gallego MD;  Location: [facility] OR;  Service: Urology        CURRENT MEDICATIONS:   Previous Medications    IBUPROFEN (MOTRIN) 200 MG TABLET    Take by mouth every 6 (six) hours as needed for mild pain    VARENICLINE (CHANTIX) 1 MG TABLET    Take 1 tablet (1 mg total) by mouth 2 (two) times a day        SOCIAL HISTORY:   Social History     Tobacco Use   • Smoking status: Every Day     Packs/day: 0 50     Years: 10 00     Pack years: 5 00     Types: Cigarettes   • Smokeless tobacco: Never   • Tobacco comments:     3529    Substance Use Topics   • Alcohol use: Yes     Comment: social        DEPRESSION SCREENING:   Depression Screening   PHQ-2/9 Depression Screening    Little interest or pleasure in doing things: 0 - not at all  Feeling down, depressed, or hopeless: 0 - not at all  PHQ-2 Score: 0  PHQ-2 Interpretation: Negative depression screen                                                                   FAMILY HISTORY:   Family History   Problem Relation Age of Onset   • Breast cancer additional onset Mother    • Hyperlipidemia Father    • Hypertension Father    • Heart disease Maternal Grandmother    • Stomach cancer Paternal Grandmother         ALLERGIES:   No Known Allergies     ROS:   Review of Systems   Constitutional: Negative for appetite change, chills, fatigue and fever  HENT: Negative for congestion, ear discharge, ear pain, postnasal drip, rhinorrhea, sinus pressure, sinus pain, sneezing, sore throat and trouble swallowing  Eyes: Negative for pain, discharge, redness, itching and visual disturbance  Respiratory: Negative for apnea, cough, chest tightness, shortness of breath and wheezing  Cardiovascular: Negative for chest pain and leg swelling  Gastrointestinal: Negative for abdominal distention, abdominal pain, blood in stool, constipation, diarrhea, nausea and vomiting  Endocrine: Negative for polyuria  Genitourinary: Negative for decreased urine volume, difficulty urinating, dysuria, flank pain, frequency, hematuria, penile discharge, penile pain, penile swelling, scrotal swelling, testicular pain and urgency  Musculoskeletal: Negative for arthralgias, back pain, gait problem, joint swelling, myalgias, neck pain and neck stiffness  Skin: Negative for rash  Neurological: Negative for dizziness, weakness, light-headedness, numbness and headaches  Psychiatric/Behavioral: Negative for behavioral problems  The patient is not nervous/anxious  All other systems reviewed and are negative  Objective:   PHYSICAL EXAM:     /82 (BP Location: Left arm, Patient Position: Sitting, Cuff Size: Adult)   Pulse 88   Temp 97 7 °F (36 5 °C) (Temporal)   Resp 16   Ht 5' 9 69" (1 77 m)   Wt 96 2 kg (212 lb)   SpO2 98%   BMI 30 69 kg/m²    No results found     Physical Exam  Vitals and nursing note reviewed  Constitutional:       General: He is not in acute distress  Appearance: Normal appearance  He is well-developed  He is not toxic-appearing  HENT:      Head: Normocephalic and atraumatic  Right Ear: Tympanic membrane, ear canal and external ear normal       Left Ear: Tympanic membrane, ear canal and external ear normal       Nose: Nose normal       Mouth/Throat:      Mouth: Mucous membranes are moist       Pharynx: Oropharynx is clear  No oropharyngeal exudate  Eyes:      Extraocular Movements: Extraocular movements intact  Conjunctiva/sclera: Conjunctivae normal       Pupils: Pupils are equal, round, and reactive to light  Cardiovascular:      Rate and Rhythm: Normal rate and regular rhythm  Heart sounds: Normal heart sounds  No murmur heard  Pulmonary:      Effort: Pulmonary effort is normal  No respiratory distress  Breath sounds: Normal breath sounds  No wheezing  Abdominal:      General: Abdomen is flat  Bowel sounds are normal       Palpations: Abdomen is soft  Tenderness: There is no abdominal tenderness  Musculoskeletal:         General: Normal range of motion  Cervical back: Normal range of motion and neck supple  Lymphadenopathy:      Cervical: No cervical adenopathy  Skin:     General: Skin is warm and dry  Neurological:      General: No focal deficit present  Mental Status: He is alert and oriented to person, place, and time  Mental status is at baseline  Psychiatric:         Mood and Affect: Mood normal          Behavior: Behavior normal          Thought Content:  Thought content normal          Judgment: Judgment normal           Assessment/Plan:   Problem List Items Addressed This Visit    None  Visit Diagnoses     Pre-employment examination    -  Primary        Urine drug screening performed today     Raphael Alcazar DO

## 2023-10-26 ENCOUNTER — TELEPHONE (OUTPATIENT)
Dept: FAMILY MEDICINE CLINIC | Facility: CLINIC | Age: 43
End: 2023-10-26

## 2023-10-26 DIAGNOSIS — H35.00 RETINOPATHY: Primary | ICD-10-CM

## 2023-10-26 DIAGNOSIS — Z13.6 SCREENING FOR HEART DISEASE: ICD-10-CM

## 2023-10-26 DIAGNOSIS — Z72.0 NICOTINE ABUSE: ICD-10-CM

## 2023-10-26 NOTE — TELEPHONE ENCOUNTER
Patient called in and left message on vm line     ". I'm currently seeing a retinal specialist and that retinal specialist has suggested that I get blood work to test for my cortisol levels so we can rule out that as an issue causing the spot in my eye. However, my primary care physician must order those tests, so I am trying to get that done sometime today so I can get the blood work done before my appointment with the retinal specialist next week. I I just wanted to get that done as soon as possible. Thank you. Have a nice day.

## 2023-10-26 NOTE — TELEPHONE ENCOUNTER
Lab orders have been placed. The cortisol specimen should be drawn between the hours of 7 AM and 9 AM to be most accurate.

## 2023-10-30 ENCOUNTER — APPOINTMENT (OUTPATIENT)
Dept: LAB | Facility: CLINIC | Age: 43
End: 2023-10-30
Payer: COMMERCIAL

## 2023-10-30 DIAGNOSIS — H35.00 RETINOPATHY: ICD-10-CM

## 2023-10-30 LAB
ALBUMIN SERPL BCP-MCNC: 4.5 G/DL (ref 3.5–5)
ALP SERPL-CCNC: 68 U/L (ref 34–104)
ALT SERPL W P-5'-P-CCNC: 15 U/L (ref 7–52)
ANION GAP SERPL CALCULATED.3IONS-SCNC: 6 MMOL/L
AST SERPL W P-5'-P-CCNC: 16 U/L (ref 13–39)
BASOPHILS # BLD AUTO: 0.07 THOUSANDS/ÂΜL (ref 0–0.1)
BASOPHILS NFR BLD AUTO: 1 % (ref 0–1)
BILIRUB SERPL-MCNC: 0.37 MG/DL (ref 0.2–1)
BUN SERPL-MCNC: 12 MG/DL (ref 5–25)
CALCIUM SERPL-MCNC: 9.2 MG/DL (ref 8.4–10.2)
CHLORIDE SERPL-SCNC: 103 MMOL/L (ref 96–108)
CHOLEST SERPL-MCNC: 209 MG/DL
CO2 SERPL-SCNC: 30 MMOL/L (ref 21–32)
CORTIS AM PEAK SERPL-MCNC: 14 UG/DL (ref 6.7–22.6)
CREAT SERPL-MCNC: 0.89 MG/DL (ref 0.6–1.3)
EOSINOPHIL # BLD AUTO: 0.16 THOUSAND/ÂΜL (ref 0–0.61)
EOSINOPHIL NFR BLD AUTO: 2 % (ref 0–6)
ERYTHROCYTE [DISTWIDTH] IN BLOOD BY AUTOMATED COUNT: 11.9 % (ref 11.6–15.1)
GFR SERPL CREATININE-BSD FRML MDRD: 106 ML/MIN/1.73SQ M
GLUCOSE P FAST SERPL-MCNC: 114 MG/DL (ref 65–99)
HCT VFR BLD AUTO: 48.2 % (ref 36.5–49.3)
HDLC SERPL-MCNC: 45 MG/DL
HGB BLD-MCNC: 16.6 G/DL (ref 12–17)
IMM GRANULOCYTES # BLD AUTO: 0.03 THOUSAND/UL (ref 0–0.2)
IMM GRANULOCYTES NFR BLD AUTO: 0 % (ref 0–2)
LDLC SERPL CALC-MCNC: 132 MG/DL (ref 0–100)
LYMPHOCYTES # BLD AUTO: 1.76 THOUSANDS/ÂΜL (ref 0.6–4.47)
LYMPHOCYTES NFR BLD AUTO: 17 % (ref 14–44)
MCH RBC QN AUTO: 30.9 PG (ref 26.8–34.3)
MCHC RBC AUTO-ENTMCNC: 34.4 G/DL (ref 31.4–37.4)
MCV RBC AUTO: 90 FL (ref 82–98)
MONOCYTES # BLD AUTO: 0.78 THOUSAND/ÂΜL (ref 0.17–1.22)
MONOCYTES NFR BLD AUTO: 8 % (ref 4–12)
NEUTROPHILS # BLD AUTO: 7.58 THOUSANDS/ÂΜL (ref 1.85–7.62)
NEUTS SEG NFR BLD AUTO: 72 % (ref 43–75)
NRBC BLD AUTO-RTO: 0 /100 WBCS
PLATELET # BLD AUTO: 266 THOUSANDS/UL (ref 149–390)
PMV BLD AUTO: 10.3 FL (ref 8.9–12.7)
POTASSIUM SERPL-SCNC: 3.8 MMOL/L (ref 3.5–5.3)
PROT SERPL-MCNC: 7.4 G/DL (ref 6.4–8.4)
RBC # BLD AUTO: 5.38 MILLION/UL (ref 3.88–5.62)
SODIUM SERPL-SCNC: 139 MMOL/L (ref 135–147)
TRIGL SERPL-MCNC: 162 MG/DL
TSH SERPL DL<=0.05 MIU/L-ACNC: 1.33 UIU/ML (ref 0.45–4.5)
WBC # BLD AUTO: 10.38 THOUSAND/UL (ref 4.31–10.16)

## 2023-10-30 PROCEDURE — 80053 COMPREHEN METABOLIC PANEL: CPT | Performed by: PHYSICIAN ASSISTANT

## 2023-10-30 PROCEDURE — 82533 TOTAL CORTISOL: CPT

## 2023-10-30 PROCEDURE — 84443 ASSAY THYROID STIM HORMONE: CPT | Performed by: PHYSICIAN ASSISTANT

## 2023-10-30 PROCEDURE — 80061 LIPID PANEL: CPT | Performed by: PHYSICIAN ASSISTANT

## 2023-10-30 PROCEDURE — 36415 COLL VENOUS BLD VENIPUNCTURE: CPT | Performed by: PHYSICIAN ASSISTANT

## 2023-10-30 PROCEDURE — 85025 COMPLETE CBC W/AUTO DIFF WBC: CPT | Performed by: PHYSICIAN ASSISTANT

## 2023-10-31 ENCOUNTER — TELEPHONE (OUTPATIENT)
Dept: FAMILY MEDICINE CLINIC | Facility: CLINIC | Age: 43
End: 2023-10-31

## 2023-10-31 NOTE — TELEPHONE ENCOUNTER
----- Message from Angelique Cantu PA-C sent at 10/31/2023  8:04 AM EDT -----  Sugar and cholesterol readings are little bit high. Continue healthy diet and exercise and follow-up/reassess in 6 months.

## 2023-12-04 ENCOUNTER — OFFICE VISIT (OUTPATIENT)
Dept: URGENT CARE | Facility: CLINIC | Age: 43
End: 2023-12-04
Payer: COMMERCIAL

## 2023-12-04 VITALS
DIASTOLIC BLOOD PRESSURE: 80 MMHG | HEART RATE: 78 BPM | RESPIRATION RATE: 18 BRPM | WEIGHT: 204 LBS | OXYGEN SATURATION: 97 % | BODY MASS INDEX: 29.53 KG/M2 | SYSTOLIC BLOOD PRESSURE: 122 MMHG | TEMPERATURE: 97 F

## 2023-12-04 DIAGNOSIS — J20.8 VIRAL BRONCHITIS: Primary | ICD-10-CM

## 2023-12-04 PROCEDURE — 99213 OFFICE O/P EST LOW 20 MIN: CPT | Performed by: NURSE PRACTITIONER

## 2023-12-04 RX ORDER — ALBUTEROL SULFATE 90 UG/1
2 AEROSOL, METERED RESPIRATORY (INHALATION) EVERY 4 HOURS PRN
Qty: 8.5 G | Refills: 0 | Status: SHIPPED | OUTPATIENT
Start: 2023-12-04

## 2023-12-04 NOTE — PROGRESS NOTES
[facility]        NAME: Amanuel Jones is a 41 y.o. male  : 1980    MRN: [id number]  DATE: 2023  TIME: 3:25 PM    Assessment and Plan   Viral bronchitis [J20.8]  1. Viral bronchitis  albuterol (ProAir HFA) 90 mcg/act inhaler        Will start course of albuterol for viral bronchitis with chest congestion and cough. Follow-up with PCP. Proceed to ER with new or worsening symptoms. Patient in agreement with plan. Patient Instructions     Follow up with PCP in 3-5 days. Proceed to  ER if symptoms worsen. Chief Complaint     Chief Complaint   Patient presents with    Cough     Patient woke up yesterday with chest congestion, cough since this am and has pain in his sternum when he breathes. History of Present Illness   Amanuel Jones presents to the clinic c/o    Cough (Patient woke up yesterday with chest congestion, cough since this am and has pain in his sternum when he breathes.)            Review of Systems   Review of Systems   All other systems reviewed and are negative. Current Medications     Long-Term Medications   Medication Sig Dispense Refill    ibuprofen (MOTRIN) 200 mg tablet Take by mouth every 6 (six) hours as needed for mild pain (Patient not taking: Reported on 2023)      varenicline (CHANTIX) 1 mg tablet Take 1 tablet (1 mg total) by mouth 2 (two) times a day (Patient not taking: Reported on 4/10/2023) 60 tablet 4       Current Allergies     Allergies as of 2023    (No Known Allergies)            The following portions of the patient's history were reviewed and updated as appropriate: allergies, current medications, past family history, past medical history, past social history, past surgical history and problem list.    Objective   /80   Pulse 78   Temp (!) 97 °F (36.1 °C) (Tympanic)   Resp 18   Wt 92.5 kg (204 lb)   SpO2 97%   BMI 29.53 kg/m²        Physical Exam     Physical Exam  Vitals and nursing note reviewed. Constitutional:       Appearance: Normal appearance. He is well-developed. HENT:      Head: Normocephalic and atraumatic. Right Ear: Tympanic membrane, ear canal and external ear normal.      Left Ear: Tympanic membrane, ear canal and external ear normal.      Nose: Nose normal.      Mouth/Throat:      Mouth: Mucous membranes are moist.   Eyes:      General: Lids are normal.      Conjunctiva/sclera: Conjunctivae normal.      Pupils: Pupils are equal, round, and reactive to light. Cardiovascular:      Rate and Rhythm: Normal rate and regular rhythm. Pulses: Normal pulses. Heart sounds: Normal heart sounds, S1 normal and S2 normal.   Pulmonary:      Effort: Pulmonary effort is normal.      Breath sounds: Normal breath sounds. Musculoskeletal:      Cervical back: Normal range of motion. Skin:     General: Skin is warm and dry. Neurological:      Mental Status: He is alert. Psychiatric:         Speech: Speech normal.         Behavior: Behavior normal.         Thought Content:  Thought content normal.         Judgment: Judgment normal.

## 2024-01-25 ENCOUNTER — OFFICE VISIT (OUTPATIENT)
Dept: FAMILY MEDICINE CLINIC | Facility: CLINIC | Age: 44
End: 2024-01-25
Payer: COMMERCIAL

## 2024-01-25 VITALS — SYSTOLIC BLOOD PRESSURE: 110 MMHG | DIASTOLIC BLOOD PRESSURE: 74 MMHG | HEART RATE: 80 BPM

## 2024-01-25 DIAGNOSIS — F41.9 ANXIETY: ICD-10-CM

## 2024-01-25 DIAGNOSIS — F32.1 MODERATE MAJOR DEPRESSION (HCC): ICD-10-CM

## 2024-01-25 DIAGNOSIS — Z00.00 HEALTHCARE MAINTENANCE: ICD-10-CM

## 2024-01-25 DIAGNOSIS — Z72.0 VAPES NICOTINE CONTAINING SUBSTANCE: Primary | ICD-10-CM

## 2024-01-25 PROCEDURE — G0439 PPPS, SUBSEQ VISIT: HCPCS | Performed by: PHYSICIAN ASSISTANT

## 2024-01-25 PROCEDURE — 99214 OFFICE O/P EST MOD 30 MIN: CPT | Performed by: PHYSICIAN ASSISTANT

## 2024-01-25 RX ORDER — BUPROPION HYDROCHLORIDE 150 MG/1
150 TABLET ORAL EVERY MORNING
Qty: 30 TABLET | Refills: 5 | Status: SHIPPED | OUTPATIENT
Start: 2024-01-25 | End: 2024-07-23

## 2024-01-25 NOTE — PATIENT INSTRUCTIONS
Assessment/plan:  1.  Mild-moderate depression-treatment options were discussed with patient.  He does not have any suicidal ideation or plan.  He is agreeable to starting Wellbutrin  mg daily.  2.  Anxiety-patient has been using medical marijuana.  He will be started on Wellbutrin as above.  Hopefully this will help control symptoms a bit better.  3.  Nicotine dependence-patient was successful with Chantix in the past however he is usually resume smoking within a few months because of anxiety and depressive symptoms.  Hopefully Wellbutrin will be a better fit for him.  If he is not able to quit successfully in the next 4 weeks it would consider adding Chantix therapy again.  Patient verbalizes understanding and agreement with plan.  4.  Healthcare maintenance-recent blood work was completed.  Patient will start colon cancer screening and prostate cancer screening at age 47.  No lung cancer screening is indicated at this time for the patient.  Continue healthy diet and exercise efforts.

## 2024-01-25 NOTE — PROGRESS NOTES
Name: Vick Pappas      : 1980      MRN: [id number]  Encounter Provider: Bjorn Hanna PA-C  Encounter Date: 2024   Encounter department: [facility]    Assessment & Plan     Patient Instructions   Assessment/plan:  1.  Mild-moderate depression-treatment options were discussed with patient.  He does not have any suicidal ideation or plan.  He is agreeable to starting Wellbutrin  mg daily.  2.  Anxiety-patient has been using medical marijuana.  He will be started on Wellbutrin as above.  Hopefully this will help control symptoms a bit better.  3.  Nicotine dependence-patient was successful with Chantix in the past however he is usually resume smoking within a few months because of anxiety and depressive symptoms.  Hopefully Wellbutrin will be a better fit for him.  If he is not able to quit successfully in the next 4 weeks it would consider adding Chantix therapy again.  Patient verbalizes understanding and agreement with plan.  4.  Healthcare maintenance-recent blood work was completed.  Patient will start colon cancer screening and prostate cancer screening at age 47.  No lung cancer screening is indicated at this time for the patient.  Continue healthy diet and exercise efforts.    1. Vapes nicotine containing substance    2. Healthcare maintenance    3. Moderate major depression (HCC)  -     buPROPion (WELLBUTRIN XL) 150 mg 24 hr tablet; Take 1 tablet (150 mg total) by mouth every morning    4. Anxiety  -     buPROPion (WELLBUTRIN XL) 150 mg 24 hr tablet; Take 1 tablet (150 mg total) by mouth every morning           Subjective      HPI: This is a 43-year-old gentleman that presents to the office for annual checkup and to discuss symptoms of anxiety and depression as well as smoking cessation.  He feels that for years he has had ups and downs with his mood.  He feels that whenever he has quit smoking successfully in the past with Chantix he is always restarted again  within a few months because of being down a bit and using nicotine as a pick me up.  He has not had any suicidal ideations or plans.  Previously he has done well with the Chantix and was able to quit and had little side effects but he did notice that he always resumes smoking within a few months.  He has never continued the Chantix for full 3 to 6 months of therapy after quitting however.      Review of Systems   Constitutional:  Negative for chills, fatigue and fever.   HENT:  Negative for congestion, ear pain and sinus pressure.    Eyes:  Negative for visual disturbance.   Respiratory:  Negative for cough, chest tightness and shortness of breath.    Cardiovascular:  Negative for chest pain and palpitations.   Gastrointestinal:  Negative for diarrhea, nausea and vomiting.   Endocrine: Negative for polyuria.   Genitourinary:  Negative for dysuria and frequency.   Musculoskeletal:  Negative for arthralgias and myalgias.   Skin:  Negative for pallor and rash.   Neurological:  Negative for dizziness, weakness, light-headedness, numbness and headaches.   Psychiatric/Behavioral:  Positive for dysphoric mood and sleep disturbance. Negative for agitation, behavioral problems, self-injury and suicidal ideas. The patient is nervous/anxious.    All other systems reviewed and are negative.      Current Outpatient Medications on File Prior to Visit   Medication Sig   • [DISCONTINUED] albuterol (ProAir HFA) 90 mcg/act inhaler Inhale 2 puffs every 4 (four) hours as needed for shortness of breath or wheezing   • [DISCONTINUED] erythromycin (ILOTYCIN) ophthalmic ointment Administer 0.5 inches to the right eye every 4 (four) hours (Patient not taking: Reported on 12/4/2023)   • [DISCONTINUED] ibuprofen (MOTRIN) 200 mg tablet Take by mouth every 6 (six) hours as needed for mild pain (Patient not taking: Reported on 12/4/2023)   • [DISCONTINUED] varenicline (CHANTIX) 1 mg tablet Take 1 tablet (1 mg total) by mouth 2 (two) times a day  (Patient not taking: Reported on 4/10/2023)       Objective     PHQ-2/9 Depression Screening    Little interest or pleasure in doing things: 1 - several days  Feeling down, depressed, or hopeless: 2 - more than half the days  Trouble falling or staying asleep, or sleeping too much: 2 - more than half the days  Feeling tired or having little energy: 2 - more than half the days  Poor appetite or overeatin - not at all  Feeling bad about yourself - or that you are a failure or have let yourself or your family down: 2 - more than half the days  Trouble concentrating on things, such as reading the newspaper or watching television: 1 - several days  Moving or speaking so slowly that other people could have noticed. Or the opposite - being so fidgety or restless that you have been moving around a lot more than usual: 2 - more than half the days  Thoughts that you would be better off dead, or of hurting yourself in some way: 0 - not at all  PHQ-2 Score: 3  PHQ-2 Interpretation: POSITIVE depression screen  PHQ-9 Score: 12  PHQ-9 Interpretation: Moderate depression        /74   Pulse 80     Physical Exam  Vitals and nursing note reviewed.   Constitutional:       General: He is not in acute distress.     Appearance: He is well-developed.   HENT:      Head: Normocephalic and atraumatic.      Right Ear: External ear normal.      Left Ear: External ear normal.      Nose: Nose normal.      Mouth/Throat:      Pharynx: No oropharyngeal exudate.   Eyes:      Conjunctiva/sclera: Conjunctivae normal.      Pupils: Pupils are equal, round, and reactive to light.   Neck:      Thyroid: No thyromegaly.      Trachea: No tracheal deviation.   Cardiovascular:      Rate and Rhythm: Normal rate and regular rhythm.      Heart sounds: Normal heart sounds. No murmur heard.     No friction rub.   Pulmonary:      Effort: Pulmonary effort is normal. No respiratory distress.      Breath sounds: Normal breath sounds. No wheezing or rales.    Abdominal:      General: Bowel sounds are normal. There is no distension.      Palpations: Abdomen is soft.      Tenderness: There is no abdominal tenderness. There is no guarding or rebound.   Musculoskeletal:         General: No tenderness. Normal range of motion.      Cervical back: Normal range of motion and neck supple.   Lymphadenopathy:      Cervical: No cervical adenopathy.   Skin:     General: Skin is warm and dry.      Findings: No erythema or rash.   Neurological:      Mental Status: He is alert and oriented to person, place, and time.      Cranial Nerves: No cranial nerve deficit.      Coordination: Coordination normal.   Psychiatric:         Behavior: Behavior normal.         Thought Content: Thought content normal.       Bjorn Hanna PA-C

## 2024-04-08 ENCOUNTER — TELEPHONE (OUTPATIENT)
Age: 44
End: 2024-04-08

## 2024-04-08 NOTE — TELEPHONE ENCOUNTER
New patient called in to schedule an appt. While following the steps of the decision tree it was recommendable to transfer patient over to the practice.

## 2024-04-08 NOTE — TELEPHONE ENCOUNTER
Patient called stating he wanted to know if he is calling the office for anal warts.  Informed pt he needs to see Colon/rectal. Patient verbalized understanding

## 2024-05-07 NOTE — PROGRESS NOTES
Diagnoses and all orders for this visit:    Anal condyloma       Anal condyloma  Patient presents for evaluation of perianal lesions.  He notes these.  Present for the past few months.  He does note a history of a penile lesion.  This was removed and was diagnosed as a condyloma.  He has 2 lesions largest which is approxi-1 cm on the right lateral position.  These are consistent with anal condyloma.  Digital rectal and anoscopy exams do not reveal any anal canal pathology.    We discussed anal condyloma and they are caused from an HPV virus point of view.  We discussed the risk of malignancy associated with this.  We discussed that this is largely treated based upon symptoms to include bleeding or discomfort.  As he has no intra-anal lesions, topical treatments would be reasonable as with surgical excision.  With this discussion patient prefers topical treatment.  Plan will be for Aldara for 3 months with follow-up to ensure resolution.      BETY Pappas is a 43 y.o. male who presents for the evaluation of a perianal growth.     He states that he first noticed this growth 4-6 months ago. He states that it has increased in size a little bit since it first appeared. He denies pain and discomfort.     He is status post excision of penile condyloma 3/16/22    He states that he has 1-2 bowel movements with soft and formed stool daily.     He states that he has not had any colonoscopy procedures.     The patient denies a family history of colon cancer.     Lab Results   Component Value Date    WBC 10.38 (H) 10/30/2023    HGB 16.6 10/30/2023    HCT 48.2 10/30/2023    MCV 90 10/30/2023     10/30/2023     Lab Results   Component Value Date    SODIUM 139 10/30/2023    K 3.8 10/30/2023     10/30/2023    CO2 30 10/30/2023    AGAP 6 10/30/2023    BUN 12 10/30/2023    CREATININE 0.89 10/30/2023    GLUC 88 12/17/2021    GLUF 114 (H) 10/30/2023    CALCIUM 9.2 10/30/2023    AST 16 10/30/2023    ALT 15  10/30/2023    ALKPHOS 68 10/30/2023    TP 7.4 10/30/2023    TBILI 0.37 10/30/2023    EGFR 106 10/30/2023       Past Medical History:   Diagnosis Date    Depression 1/2/2024    Visual impairment 9/16/2023    Resolved     Past Surgical History:   Procedure Laterality Date    KNEE ARTHROSCOPY      KNEE SURGERY      x 2-arthroscopy    LASER ABLATION OF CONDYLOMAS N/A 3/16/2022    Procedure: EXCISION CONDYLOMA PERINEAL (PENILE/VAGINAL) WITH LASER CO2, removal scrotal skin tag;  Surgeon: Tomer Polanco MD;  Location: [facility] OR;  Service: Urology       Current Outpatient Medications:     buPROPion (WELLBUTRIN XL) 150 mg 24 hr tablet, Take 1 tablet (150 mg total) by mouth every morning, Disp: 30 tablet, Rfl: 5  Allergies as of 05/09/2024    (No Known Allergies)     Review of Systems   All other systems reviewed and are negative.    Vitals:    05/09/24 1112   BP: 138/86     Physical Exam  Constitutional:       Appearance: Normal appearance.   HENT:      Head: Normocephalic and atraumatic.   Eyes:      Pupils: Pupils are equal, round, and reactive to light.   Pulmonary:      Effort: Pulmonary effort is normal.   Musculoskeletal:         General: Normal range of motion.   Skin:     General: Skin is warm and dry.   Neurological:      General: No focal deficit present.      Mental Status: He is alert and oriented to person, place, and time.   Psychiatric:         Mood and Affect: Mood normal.         Behavior: Behavior normal.         Thought Content: Thought content normal.         Judgment: Judgment normal.     Lower Endoscopy    Date/Time: 5/9/2024 11:20 AM    Performed by: Rick Sorto MD  Authorized by: Rick Sorto MD    Verbal consent obtained?: Yes    Risks and benefits: Risks, benefits and alternatives were discussed    Consent given by:  Patient  Patient sedated: No    Scope type:  Anoscope  External exam performed: Yes    Pilonidal sinus tract: No    Pilonidal tenderness: No    Perianal skin  tags: No    Perirectal warts: Yes    Perianal maceration: No    Perianal induration: No    Perianal erythema: No    External hemorrhoids: No    Digital exam performed: Yes    Laxity of anal sphincter: No    Prostate tenderness: No    Internal hemorrhoids: Yes    Prolapsed: No    Intraluminal mass: No    Inflammation: No    Anal fissures: No    Anal fistulae: No    Anal stricture: No    Abscess: No    Procedure termination:  Procedure complete  Patient tolerance:  Patient tolerated the procedure well with no immediate complications

## 2024-05-09 ENCOUNTER — OFFICE VISIT (OUTPATIENT)
Age: 44
End: 2024-05-09
Payer: COMMERCIAL

## 2024-05-09 VITALS
BODY MASS INDEX: 30.07 KG/M2 | HEIGHT: 69 IN | SYSTOLIC BLOOD PRESSURE: 138 MMHG | DIASTOLIC BLOOD PRESSURE: 86 MMHG | WEIGHT: 203 LBS

## 2024-05-09 DIAGNOSIS — A63.0 ANAL CONDYLOMA: Primary | ICD-10-CM

## 2024-05-09 PROCEDURE — 46600 DIAGNOSTIC ANOSCOPY SPX: CPT | Performed by: COLON & RECTAL SURGERY

## 2024-05-09 PROCEDURE — 99243 OFF/OP CNSLTJ NEW/EST LOW 30: CPT | Performed by: COLON & RECTAL SURGERY

## 2024-05-09 RX ORDER — IMIQUIMOD 12.5 MG/.25G
1 CREAM TOPICAL 3 TIMES WEEKLY
Qty: 12 PACKET | Refills: 2 | Status: SHIPPED | OUTPATIENT
Start: 2024-05-10 | End: 2024-08-08

## 2024-05-09 NOTE — ASSESSMENT & PLAN NOTE
Patient presents for evaluation of perianal lesions.  He notes these.  Present for the past few months.  He does note a history of a penile lesion.  This was removed and was diagnosed as a condyloma.  He has 2 lesions largest which is approxi-1 cm on the right lateral position.  These are consistent with anal condyloma.  Digital rectal and anoscopy exams do not reveal any anal canal pathology.    We discussed anal condyloma and they are caused from an HPV virus point of view.  We discussed the risk of malignancy associated with this.  We discussed that this is largely treated based upon symptoms to include bleeding or discomfort.  As he has no intra-anal lesions, topical treatments would be reasonable as with surgical excision.  With this discussion patient prefers topical treatment.  Plan will be for Aline for 3 months with follow-up to ensure resolution.

## 2024-08-05 NOTE — PROGRESS NOTES
Diagnoses and all orders for this visit:    Anal condyloma       Anal condyloma  Patient presents for follow-up of anal condyloma.  He notes he was able to use the Aldara for 1 month with a short break in between secondary to discomfort.  He notes improvement but not complete resolution of lesions.  Examination shows a small sub-2 mm lesion in the right.  It is questionable as to whether this represents remnant skin tag versus true condyloma.  No other large dominant lesions are seen.  This represents an improvement to her prior visit.  We discussed surgical treatment versus completion medical therapy.  Plan will be medical therapy with reevaluation in 3 months.      HPI    Vick Pappas is a 43 y.o. male here for a 3 month follow up to Anal Condyloma. Last office visit was 5/9/24.         He is status post excision of penile condyloma 3/16/22 -Final Diagnosis A. Skin, Cyst/Tag/Debridement, scrotal lesion: - Predominantly dermal melanocytic nevus, congenital type. B. Skin, Cyst/Tag/Debridement, penile lesion: - Condyloma acuminata.      Past Medical History:   Diagnosis Date    Depression 1/2/2024    Visual impairment 9/16/2023    Resolved     Past Surgical History:   Procedure Laterality Date    KNEE ARTHROSCOPY      KNEE SURGERY      x 2-arthroscopy    LASER ABLATION OF CONDYLOMAS N/A 3/16/2022    Procedure: EXCISION CONDYLOMA PERINEAL (PENILE/VAGINAL) WITH LASER CO2, removal scrotal skin tag;  Surgeon: Tomer Polanco MD;  Location: [facility];  Service: Urology       Current Outpatient Medications:     imiquimod (ALDARA) 5 % cream, Apply 1 packet topically 3 (three) times a week, Disp: 12 packet, Rfl: 2    buPROPion (WELLBUTRIN XL) 150 mg 24 hr tablet, Take 1 tablet (150 mg total) by mouth every morning, Disp: 30 tablet, Rfl: 5  Allergies as of 08/08/2024    (No Known Allergies)     Review of Systems   All other systems reviewed and are negative.    Vitals:    08/08/24 0822   BP: 140/80     Physical  Exam  Constitutional:       Appearance: Normal appearance.   HENT:      Head: Normocephalic and atraumatic.   Eyes:      Extraocular Movements: Extraocular movements intact.      Pupils: Pupils are equal, round, and reactive to light.   Pulmonary:      Effort: Pulmonary effort is normal.   Genitourinary:     Comments: 2 mm skin redundancy right anal verge.  Questionable skin tag versus condyloma.  Digital rectal exam is unremarkable.  Skin:     General: Skin is warm.   Neurological:      General: No focal deficit present.      Mental Status: He is alert and oriented to person, place, and time.   Psychiatric:         Mood and Affect: Mood normal.         Behavior: Behavior normal.         Thought Content: Thought content normal.         Judgment: Judgment normal.

## 2024-08-08 ENCOUNTER — OFFICE VISIT (OUTPATIENT)
Age: 44
End: 2024-08-08
Payer: COMMERCIAL

## 2024-08-08 VITALS — HEIGHT: 69 IN | DIASTOLIC BLOOD PRESSURE: 80 MMHG | BODY MASS INDEX: 29.98 KG/M2 | SYSTOLIC BLOOD PRESSURE: 140 MMHG

## 2024-08-08 DIAGNOSIS — A63.0 ANAL CONDYLOMA: Primary | ICD-10-CM

## 2024-08-08 PROCEDURE — 99213 OFFICE O/P EST LOW 20 MIN: CPT | Performed by: COLON & RECTAL SURGERY

## 2024-08-08 NOTE — ASSESSMENT & PLAN NOTE
Patient presents for follow-up of anal condyloma.  He notes he was able to use the Aldara for 1 month with a short break in between secondary to discomfort.  He notes improvement but not complete resolution of lesions.  Examination shows a small sub-2 mm lesion in the right.  It is questionable as to whether this represents remnant skin tag versus true condyloma.  No other large dominant lesions are seen.  This represents an improvement to her prior visit.  We discussed surgical treatment versus completion medical therapy.  Plan will be medical therapy with reevaluation in 3 months.

## 2024-09-18 ENCOUNTER — PATIENT MESSAGE (OUTPATIENT)
Dept: FAMILY MEDICINE CLINIC | Facility: CLINIC | Age: 44
End: 2024-09-18

## 2024-09-23 ENCOUNTER — AMB VIDEO VISIT (OUTPATIENT)
Dept: OTHER | Facility: HOSPITAL | Age: 44
End: 2024-09-23
Payer: COMMERCIAL

## 2024-09-23 VITALS — TEMPERATURE: 100.7 F

## 2024-09-23 DIAGNOSIS — J02.9 ACUTE PHARYNGITIS, UNSPECIFIED ETIOLOGY: Primary | ICD-10-CM

## 2024-09-23 PROCEDURE — 99212 OFFICE O/P EST SF 10 MIN: CPT | Performed by: NURSE PRACTITIONER

## 2024-09-23 RX ORDER — AMOXICILLIN 500 MG/1
500 TABLET, FILM COATED ORAL 2 TIMES DAILY
Qty: 20 TABLET | Refills: 0 | Status: SHIPPED | OUTPATIENT
Start: 2024-09-23 | End: 2024-10-03

## 2024-09-23 NOTE — PATIENT INSTRUCTIONS
Will treat based on exposure.  Start antibiotic.  Take probiotic.  Salt water gargles and throat lozenges as needed.  Tylenol or Motrin for pain or fever.  Follow up with PCP if no improvement.  Go to ER with worsening symptoms.     Pharyngitis   WHAT YOU NEED TO KNOW:   Pharyngitis, or sore throat, is inflammation of the tissues and structures in your pharynx (throat). Pharyngitis is most often caused by bacteria. It may also be caused by a cold or flu virus. Other causes include smoking, allergies, or acid reflux.   DISCHARGE INSTRUCTIONS:   Call 911 for any of the following:   You have trouble breathing or swallowing because your throat is swollen or sore.    Return to the emergency department if:   You are drooling because it hurts too much to swallow.    Your fever is higher than 102?F (39?C) or lasts longer than 3 days.    You are confused.    You taste blood in your throat.  Contact your healthcare provider if:   Your throat pain gets worse.    You have a painful lump in your throat that does not go away after 5 days.    Your symptoms do not improve after 5 days.    You have questions or concerns about your condition or care.  Medicines:  Viral pharyngitis will go away on its own without treatment. Your sore throat should start to feel better in 3 to 5 days for both viral and bacterial infections. You may need any of the following:  Antibiotics  treat a bacterial infection.    NSAIDs , such as ibuprofen, help decrease swelling, pain, and fever. NSAIDs can cause stomach bleeding or kidney problems in certain people. If you take blood thinner medicine, always ask your healthcare provider if NSAIDs are safe for you. Always read the medicine label and follow directions.    Acetaminophen  decreases pain and fever. It is available without a doctor's order. Ask how much to take and how often to take it. Follow directions. Acetaminophen can cause liver damage if not taken correctly.    Take your medicine as directed.   Contact your healthcare provider if you think your medicine is not helping or if you have side effects. Tell him or her if you are allergic to any medicine. Keep a list of the medicines, vitamins, and herbs you take. Include the amounts, and when and why you take them. Bring the list or the pill bottles to follow-up visits. Carry your medicine list with you in case of an emergency.  Manage your symptoms:   Gargle salt water.  Mix ¼ teaspoon salt in an 8 ounce glass of warm water and gargle. This may help decrease swelling in your throat.    Drink liquids as directed.  You may need to drink more liquids than usual. Liquids may help soothe your throat and prevent dehydration. Ask how much liquid to drink each day and which liquids are best for you.    Use a cool-steam humidifier  to help moisten the air in your room and calm your cough.    Soothe your throat  with cough drops, ice, soft foods, or popsicles.  Prevent the spread of pharyngitis:  Cover your mouth and nose when you cough or sneeze. Do not share food or drinks. Wash your hands often. Use soap and water. If soap and water are unavailable, use an alcohol based hand .   Follow up with your healthcare provider as directed:  Write down your questions so you remember to ask them during your visits.   © 2017 [employer] Information is for End User's use only and may not be sold, redistributed or otherwise used for commercial purposes. All illustrations and images included in CareNotes® are the copyrighted property of [employer] or [employer].  The above information is an  only. It is not intended as medical advice for individual conditions or treatments. Talk to your doctor, nurse or pharmacist before following any medical regimen to see if it is safe and effective for you.

## 2024-09-23 NOTE — PROGRESS NOTES
Virtual Regular Visit  Name: Vick Pappas      : 1980      MRN: [id number]  Encounter Provider: RUSSELL Myers  Encounter Date: 2024   Encounter department: VIRTUAL CARE     Verification of patient location:    Patient is located at Home in the following state in which I hold an active license [place]    Assessment & Plan  Acute pharyngitis, unspecified etiology    Orders:    amoxicillin (AMOXIL) 500 MG tablet; Take 1 tablet (500 mg total) by mouth 2 (two) times a day for 10 days         Encounter provider RUSSELL Myers    The patient was identified by name and date of birth. Vick Pappas was informed that this is a telemedicine visit and that the visit is being conducted through the cashcloud platform. He agrees to proceed..  My office door was closed. No one else was in the room.  He acknowledged consent and understanding of privacy and security of the video platform. The patient has agreed to participate and understands they can discontinue the visit at any time.    Patient is aware this is a billable service.     History of Present Illness     This is a 44 year old male here today for video visit.  He states he has sore throat, headache and cough.  He had tmax of 100.7.   He is eating and drinking okay.  Appetite is decreased.  He has not been taking anything OTC He states his daughter tested positive for strep yesterday. His symptoms started in the last 12 hours.           History obtained from : patient  Review of Systems   Constitutional:  Positive for activity change, chills and fever.   HENT:  Positive for rhinorrhea and sore throat.    Respiratory:  Positive for cough.    Neurological: Negative.    Psychiatric/Behavioral: Negative.       Current Outpatient Medications on File Prior to Visit   Medication Sig Dispense Refill    imiquimod (ALDARA) 5 % cream Apply 1 packet topically 3 (three) times a week 12 packet 2    [DISCONTINUED] buPROPion (WELLBUTRIN XL) 150 mg 24 hr tablet  Take 1 tablet (150 mg total) by mouth every morning 30 tablet 5     No current facility-administered medications on file prior to visit.          Objective     Temp (!) 100.7 °F (38.2 °C)   Physical Exam  Constitutional:       General: He is not in acute distress.     Appearance: Normal appearance. He is not ill-appearing or toxic-appearing.   HENT:      Head: Normocephalic.   Eyes:      Conjunctiva/sclera: Conjunctivae normal.   Pulmonary:      Effort: Pulmonary effort is normal. No respiratory distress.      Comments: No rash on head or neck.   Neurological:      Mental Status: He is alert.   Psychiatric:         Mood and Affect: Mood normal.         Behavior: Behavior normal.         Thought Content: Thought content normal.         Judgment: Judgment normal.         Visit Time  Total Visit Duration: 5

## 2024-09-23 NOTE — CARE ANYWHERE EVISITS
Visit Summary for BLAZE SPENCER - Gender: Male - Date of Birth: 1980  Date: [id number] - Duration: 3 minutes  Patient: BLAZE SPENCER  Provider: Mary WELLS    Patient Contact Information  Address  [address] [place] [address]  [place] [address]  [phone]    Visit Topics  Strep throat [Added By: Self - 2024-09-23]  Fever [Added By: Self - 2024-09-23]  Headache [Added By: Self - 2024-09-23]    Triage Questions   What is your current physical address in the event of a medical emergency? Answer []  Are you allergic to any medications? Answer []  Are you now or could you be pregnant? Answer []  Do you have any immune system compromise or chronic lung   disease? Answer []  Do you have any vulnerable family members in the home (infant, pregnant, cancer, elderly)? Answer []     Conversation Transcripts  [0A][0A] [Notification] You are connected with Mary WELLS, Urgent Care Specialist.[0A][Notification] BLAZE SPENCER is located in [place].[0A][Notification] BLAZE SPENCER has shared health history...[0A]    Diagnosis  Acute pharyngitis    Procedures  Value: [id number] Code: CPT-4 UNLISTED E&M SERVICE    Medications Prescribed    No prescriptions ordered    Electronically signed by: Mary Mcleod(NPI [id number])

## 2024-11-22 ENCOUNTER — OFFICE VISIT (OUTPATIENT)
Dept: FAMILY MEDICINE CLINIC | Facility: CLINIC | Age: 44
End: 2024-11-22
Payer: COMMERCIAL

## 2024-11-22 VITALS
HEIGHT: 70 IN | BODY MASS INDEX: 29.23 KG/M2 | SYSTOLIC BLOOD PRESSURE: 112 MMHG | HEART RATE: 93 BPM | DIASTOLIC BLOOD PRESSURE: 82 MMHG | OXYGEN SATURATION: 97 % | TEMPERATURE: 98.4 F | WEIGHT: 204.2 LBS | RESPIRATION RATE: 16 BRPM

## 2024-11-22 DIAGNOSIS — R30.0 DYSURIA: Primary | ICD-10-CM

## 2024-11-22 LAB
SL AMB  POCT GLUCOSE, UA: NORMAL
SL AMB LEUKOCYTE ESTERASE,UA: NORMAL
SL AMB POCT BILIRUBIN,UA: NORMAL
SL AMB POCT BLOOD,UA: NORMAL
SL AMB POCT CLARITY,UA: NORMAL
SL AMB POCT COLOR,UA: YELLOW
SL AMB POCT KETONES,UA: NORMAL
SL AMB POCT NITRITE,UA: NORMAL
SL AMB POCT PH,UA: 6
SL AMB POCT SPECIFIC GRAVITY,UA: 1.02
SL AMB POCT URINE PROTEIN: NORMAL
SL AMB POCT UROBILINOGEN: 0.2

## 2024-11-22 PROCEDURE — 87591 N.GONORRHOEAE DNA AMP PROB: CPT | Performed by: PHYSICIAN ASSISTANT

## 2024-11-22 PROCEDURE — 87491 CHLMYD TRACH DNA AMP PROBE: CPT | Performed by: PHYSICIAN ASSISTANT

## 2024-11-22 PROCEDURE — 87086 URINE CULTURE/COLONY COUNT: CPT | Performed by: PHYSICIAN ASSISTANT

## 2024-11-22 PROCEDURE — 99214 OFFICE O/P EST MOD 30 MIN: CPT | Performed by: PHYSICIAN ASSISTANT

## 2024-11-22 PROCEDURE — 81002 URINALYSIS NONAUTO W/O SCOPE: CPT | Performed by: PHYSICIAN ASSISTANT

## 2024-11-22 RX ORDER — CIPROFLOXACIN 500 MG/1
500 TABLET, FILM COATED ORAL EVERY 12 HOURS SCHEDULED
Qty: 14 TABLET | Refills: 0 | Status: SHIPPED | OUTPATIENT
Start: 2024-11-22 | End: 2024-11-29

## 2024-11-22 NOTE — PROGRESS NOTES
Name: Vick Pappas      : 1980      MRN: [id number]  Encounter Provider: Bjorn Hanna PA-C  Encounter Date: 2024   Encounter department: [facility] PRIMARY CARE  :  Patient Instructions       Assessment/plan:  1.  Dysuria-patient does have some trace blood in the urine.  Will send for culture as well as GC and chlamydia.  Will start patient on Cipro 500 mg twice daily for 7 days.  Recommend follow-up if symptoms are persistent.  We discussed signs to return for such as flank pains, fevers, chills, or other worsening signs of infection.    Assessment & Plan           History of Present Illness     HPI: This is a 44-year-old gentleman that presents to the office with concerns over dysuria which has been present for about 4 days.  He noticed that it started to burn upon urination and last for about 10 to 15 minutes afterward.  He has been trying to drink plenty of fluids and he has noticed that his urine is more dark and malodorous than normal.  He is not having any fevers or chills and he denies flank pain.  He has had some history of chlamydia infection over a decade ago which was treated and patient had negative test of cure.  He has occasionally since had episodes of penile discharge which is kind of milky white and sometimes mixed with the urine.      Review of Systems   Constitutional:  Negative for appetite change, chills, diaphoresis, fatigue and fever.   HENT:  Negative for congestion, ear pain, facial swelling, postnasal drip, rhinorrhea, sinus pressure, sinus pain, sore throat, trouble swallowing and voice change.    Respiratory:  Negative for cough, chest tightness, shortness of breath and wheezing.    Cardiovascular:  Negative for chest pain.   Gastrointestinal:  Negative for abdominal pain, diarrhea, nausea and vomiting.   Genitourinary:  Positive for dysuria, frequency and urgency.   Musculoskeletal:  Negative for myalgias.   Neurological:  Negative for headaches.         "  Objective   /82 (BP Location: Left arm, Patient Position: Sitting, Cuff Size: Adult)   Pulse 93   Temp 98.4 °F (36.9 °C) (Temporal)   Resp 16   Ht 5' 10\" (1.778 m)   Wt 92.6 kg (204 lb 3.2 oz)   SpO2 97%   BMI 29.30 kg/m²      Physical Exam  Constitutional:       General: He is not in acute distress.     Appearance: He is well-developed.   HENT:      Head: Normocephalic and atraumatic.      Right Ear: Tympanic membrane normal.      Left Ear: Tympanic membrane normal.   Eyes:      Conjunctiva/sclera: Conjunctivae normal.   Cardiovascular:      Rate and Rhythm: Normal rate and regular rhythm.   Pulmonary:      Effort: Pulmonary effort is normal.   Abdominal:      General: Abdomen is flat. Bowel sounds are normal. There is no distension.      Palpations: Abdomen is soft. There is no mass.      Tenderness: There is no right CVA tenderness or left CVA tenderness.   Musculoskeletal:         General: Normal range of motion.      Cervical back: Normal range of motion.   Skin:     General: Skin is warm.      Findings: No rash.   Neurological:      Mental Status: He is alert and oriented to person, place, and time.   Psychiatric:         Mood and Affect: Mood normal.         "

## 2024-11-22 NOTE — PATIENT INSTRUCTIONS
Assessment/plan:  1.  Dysuria-patient does have some trace blood in the urine.  Will send for culture as well as GC and chlamydia.  Will start patient on Cipro 500 mg twice daily for 7 days.  Recommend follow-up if symptoms are persistent.  We discussed signs to return for such as flank pains, fevers, chills, or other worsening signs of infection.   no

## 2024-11-23 LAB
C TRACH DNA SPEC QL NAA+PROBE: NEGATIVE
N GONORRHOEA DNA SPEC QL NAA+PROBE: NEGATIVE

## 2024-11-24 LAB — BACTERIA UR CULT: NORMAL

## 2024-11-26 ENCOUNTER — RESULTS FOLLOW-UP (OUTPATIENT)
Dept: FAMILY MEDICINE CLINIC | Facility: CLINIC | Age: 44
End: 2024-11-26

## 2024-11-26 DIAGNOSIS — R30.0 DYSURIA: Primary | ICD-10-CM

## 2024-12-17 RX ORDER — CIPROFLOXACIN 500 MG/1
500 TABLET, FILM COATED ORAL EVERY 12 HOURS SCHEDULED
Qty: 20 TABLET | Refills: 0 | Status: SHIPPED | OUTPATIENT
Start: 2024-12-17 | End: 2024-12-27

## 2024-12-17 NOTE — TELEPHONE ENCOUNTER
I would recommend continuing Cipro for a longer course of therapy and scheduling follow-up with urology for evaluation.

## 2024-12-30 ENCOUNTER — PATIENT MESSAGE (OUTPATIENT)
Dept: FAMILY MEDICINE CLINIC | Facility: CLINIC | Age: 44
End: 2024-12-30

## 2024-12-30 NOTE — PATIENT COMMUNICATION
Patient called stating that he is worried about a flare. Last time he went off ciprofloxacin (CIPRO) 500 mg tablet for a week, he flared up. Patients next Urology appointment is not until 1/6/25. Patient is requesting a refill on Cipr as he ran out of his medication yesterday. Please advise and return patients call at [phone].

## 2025-01-06 ENCOUNTER — OFFICE VISIT (OUTPATIENT)
Dept: UROLOGY | Facility: AMBULATORY SURGERY CENTER | Age: 45
End: 2025-01-06
Payer: COMMERCIAL

## 2025-01-06 VITALS
OXYGEN SATURATION: 98 % | BODY MASS INDEX: 29.3 KG/M2 | SYSTOLIC BLOOD PRESSURE: 140 MMHG | HEART RATE: 97 BPM | DIASTOLIC BLOOD PRESSURE: 86 MMHG | HEIGHT: 70 IN

## 2025-01-06 DIAGNOSIS — R31.29 MICROSCOPIC HEMATURIA: ICD-10-CM

## 2025-01-06 DIAGNOSIS — R30.0 DYSURIA: Primary | ICD-10-CM

## 2025-01-06 LAB
BACTERIA UR QL AUTO: ABNORMAL /HPF
BILIRUB UR QL STRIP: NEGATIVE
CLARITY UR: CLEAR
COLOR UR: YELLOW
GLUCOSE UR STRIP-MCNC: NEGATIVE MG/DL
HGB UR QL STRIP.AUTO: NEGATIVE
KETONES UR STRIP-MCNC: NEGATIVE MG/DL
LEUKOCYTE ESTERASE UR QL STRIP: NEGATIVE
MUCOUS THREADS UR QL AUTO: ABNORMAL
NITRITE UR QL STRIP: NEGATIVE
NON-SQ EPI CELLS URNS QL MICRO: ABNORMAL /HPF
PH UR STRIP.AUTO: 6 [PH]
POST-VOID RESIDUAL VOLUME, ML POC: 54 ML
PROT UR STRIP-MCNC: ABNORMAL MG/DL
RBC #/AREA URNS AUTO: ABNORMAL /HPF
SL AMB  POCT GLUCOSE, UA: NORMAL
SL AMB LEUKOCYTE ESTERASE,UA: NORMAL
SL AMB POCT BILIRUBIN,UA: NORMAL
SL AMB POCT BLOOD,UA: NORMAL
SL AMB POCT CLARITY,UA: CLEAR
SL AMB POCT COLOR,UA: YELLOW
SL AMB POCT KETONES,UA: NORMAL
SL AMB POCT NITRITE,UA: NORMAL
SL AMB POCT PH,UA: 6
SL AMB POCT SPECIFIC GRAVITY,UA: 1.02
SL AMB POCT URINE PROTEIN: NORMAL
SL AMB POCT UROBILINOGEN: 0.2
SP GR UR STRIP.AUTO: 1.02 (ref 1–1.03)
UROBILINOGEN UR STRIP-ACNC: <2 MG/DL
WBC #/AREA URNS AUTO: ABNORMAL /HPF

## 2025-01-06 PROCEDURE — 51798 US URINE CAPACITY MEASURE: CPT

## 2025-01-06 PROCEDURE — 81002 URINALYSIS NONAUTO W/O SCOPE: CPT

## 2025-01-06 PROCEDURE — 99203 OFFICE O/P NEW LOW 30 MIN: CPT

## 2025-01-06 PROCEDURE — 81001 URINALYSIS AUTO W/SCOPE: CPT

## 2025-01-06 RX ORDER — CIPROFLOXACIN 500 MG/5ML
KIT ORAL
COMMUNITY
Start: 2024-12-17

## 2025-01-06 NOTE — ASSESSMENT & PLAN NOTE
Patient had issues with dysuria and burning with ejaculation that started in November and has since resolved, he was prescribed 2 courses of Cipro by his PCP and noticed resolution of the symptoms after completing the course of the antibiotics  PVR in office today 54 mL  UA in office today with trace blood, will send for microscopic urinalysis  STI testing in November 2024 negative   Discussed the importance of adequate fluid and water intake of at least 64 ounces of water daily  I did discuss with the patient that it does sound like he may have had an episode of acute prostatitis although his urine culture from 11/22/2024 demonstrated no growth, I did explain to him that his symptoms were consistent with acute prostatitis and he did have resolution of symptoms with the antibiotics  If symptoms were to come back or worsen I would recommend he make another appointment to discuss plan of care  Orders:    Ambulatory Referral to Urology    POCT Measure PVR    POCT urine dip    Urinalysis with microscopic

## 2025-01-06 NOTE — ASSESSMENT & PLAN NOTE
UA in office today does demonstrate trace amount of blood.  Will send for formal microscopic urinalysis.  I did explain to the patient that if there is a clinically significant amount of blood in the urine we will plan to proceed with a CT urogram due to his extensive smoking history.  He is understanding of this and amenable to the plan.  Orders:    POCT urine dip    Urinalysis with microscopic

## 2025-01-06 NOTE — PROGRESS NOTES
Name: Vick Pappas      : 1980      MRN: [id number]  Encounter Provider: RUSSELL Cheema  Encounter Date: 2025   Encounter department: [facility] UROLOGY [place]  :  Assessment & Plan  Dysuria  Patient had issues with dysuria and burning with ejaculation that started in November and has since resolved, he was prescribed 2 courses of Cipro by his PCP and noticed resolution of the symptoms after completing the course of the antibiotics  PVR in office today 54 mL  UA in office today with trace blood, will send for microscopic urinalysis  STI testing in 2024 negative   Discussed the importance of adequate fluid and water intake of at least 64 ounces of water daily  I did discuss with the patient that it does sound like he may have had an episode of acute prostatitis although his urine culture from 2024 demonstrated no growth, I did explain to him that his symptoms were consistent with acute prostatitis and he did have resolution of symptoms with the antibiotics  If symptoms were to come back or worsen I would recommend he make another appointment to discuss plan of care  Orders:    Ambulatory Referral to Urology    POCT Measure PVR    POCT urine dip    Urinalysis with microscopic    Microscopic hematuria  UA in office today does demonstrate trace amount of blood.  Will send for formal microscopic urinalysis.  I did explain to the patient that if there is a clinically significant amount of blood in the urine we will plan to proceed with a CT urogram due to his extensive smoking history.  He is understanding of this and amenable to the plan.  Orders:    POCT urine dip    Urinalysis with microscopic        History of Present Illness   Vick Pappas is a 44 y.o. male who presents today to the office as a new patient for further evaluation of dysuria.  He states that back in November he started noticing burning with urination and burning with ejaculation.  He was placed on a  7-day course of Cipro by his PCP which did moderately improve his symptoms.  He states that once he discontinued the antibiotics his symptoms returned relatively quickly.  He was then placed on a additional course of Cipro for 10 days.  He completed the course of antibiotics on 12/28/2024 and since that time has noticed a significant improvement in his symptoms.  He states that he will occasionally have burning with urination in the morning but it typically resolves.  He states he drinks about 40 to 50 ounces of water daily.  He does report that he occasionally has cloudy urine.  He denies any gross hematuria.  He denies any suprapubic or flank pain.      AUA SYMPTOM SCORE      Flowsheet Row Most Recent Value   AUA SYMPTOM SCORE    How often have you had a sensation of not emptying your bladder completely after you finished urinating? 1 (P)     How often have you had to urinate again less than two hours after you finished urinating? 3 (P)     How often have you found you stopped and started again several times when you urinate? 1 (P)     How often have you found it difficult to postpone urination? 1 (P)     How often have you had a weak urinary stream? 3 (P)     How often have you had to push or strain to begin urination? 1 (P)     How many times did you most typically get up to urinate from the time you went to bed at night until the time you got up in the morning? 1 (P)     Quality of Life: If you were to spend the rest of your life with your urinary condition just the way it is now, how would you feel about that? 5 (P)     AUA SYMPTOM SCORE 11 (P)            Review of Systems   Constitutional:  Negative for activity change, appetite change, chills, fever and unexpected weight change.   HENT: Negative.     Respiratory: Negative.  Negative for shortness of breath.    Cardiovascular: Negative.  Negative for chest pain.   Gastrointestinal:  Negative for abdominal pain, diarrhea, nausea and vomiting.   Endocrine:  "Negative.    Genitourinary:  Positive for dysuria, flank pain, frequency, hematuria and urgency. Negative for decreased urine volume and difficulty urinating.   Musculoskeletal:  Negative for back pain and gait problem.   Skin: Negative.    Allergic/Immunologic: Negative.    Neurological: Negative.    Hematological:  Negative for adenopathy. Does not bruise/bleed easily.          Objective   There were no vitals taken for this visit.    Physical Exam  Vitals reviewed.   Constitutional:       Appearance: Normal appearance.   HENT:      Head: Normocephalic and atraumatic.   Eyes:      Pupils: Pupils are equal, round, and reactive to light.   Cardiovascular:      Rate and Rhythm: Normal rate.   Pulmonary:      Effort: Pulmonary effort is normal.   Musculoskeletal:      Cervical back: Normal range of motion.   Skin:     General: Skin is warm and dry.   Neurological:      General: No focal deficit present.      Mental Status: He is alert and oriented to person, place, and time.   Psychiatric:         Mood and Affect: Mood normal.         Behavior: Behavior normal.         Thought Content: Thought content normal.         Judgment: Judgment normal.          Results  No results found for: \"PSA\"  Lab Results   Component Value Date    CALCIUM 9.2 10/30/2023    K 3.8 10/30/2023    CO2 30 10/30/2023     10/30/2023    BUN 12 10/30/2023    CREATININE 0.89 10/30/2023     Lab Results   Component Value Date    WBC 10.38 (H) 10/30/2023    HGB 16.6 10/30/2023    HCT 48.2 10/30/2023    MCV 90 10/30/2023     10/30/2023       Office Urine Dip  No results found for this or any previous visit (from the past hour).]      Answers submitted by the patient for this visit:  Painful Urination Questionnaire (Submitted on 1/6/2025)  Chief Complaint: Dysuria  Chronicity: recurrent  Onset: more than 1 month ago  Frequency: intermittently  Progression since onset: waxing and waning  Pain quality: burning  Pain severity: no pain  Pain - " numeric: 4/10  Fever: no fever  Fever duration: less than 1 day  Sexually active?: Yes  History of pyelonephritis?: No  hesitancy: Yes

## 2025-01-07 ENCOUNTER — RESULTS FOLLOW-UP (OUTPATIENT)
Dept: UROLOGY | Facility: AMBULATORY SURGERY CENTER | Age: 45
End: 2025-01-07

## 2025-01-07 NOTE — TELEPHONE ENCOUNTER
Called and spoke directly to this pt and relayed this message in detail. I asked the pt if he understood the message I just relayed and he responded YES. If this pt calls back please relay these messages again. Thank you                            ----- Message from RUSSELL Cheema sent at 1/7/2025  7:31 AM EST -----  Please call Hiram and let him know that I reviewed his urinalysis and it does not demonstrate a clinically significant amount of red blood cells and we do not need to do any additional workup at this time for the blood in the urine.

## 2025-03-11 ENCOUNTER — NURSE TRIAGE (OUTPATIENT)
Dept: UROLOGY | Facility: AMBULATORY SURGERY CENTER | Age: 45
End: 2025-03-11

## 2025-03-11 DIAGNOSIS — R39.9 UTI SYMPTOMS: Primary | ICD-10-CM

## 2025-03-11 NOTE — TELEPHONE ENCOUNTER
Reason for Disposition  • The patient has an unknown case of mild dysuria    Answer Assessment - Initial Assessment Questions  1. When did your symptoms start?   Burning with urination 4 days;  frequency;  odor;    2. Do you experience pain or burning with every void?   yes  3. Do you have any other urinary symptoms such as urinary frequency, urgency, incontinence, blood in your urine?   no  4. Do you have any abdominal pain or flank pain?  no  5. Do you have a fever of 101 or higher? How did you take your temperature?   no  6. Does your urine stream spray? (Specifically for males)   Good urine spray  7. Have you become unable to urinate?   no  8. Do you have a history of urinary tract infections?   no    Protocols used: Urology-Dysuria-ADULT-[place]

## 2025-03-11 NOTE — PATIENT COMMUNICATION
FOLLOW UP: ongoing urinary discomfort;   REASON FOR CONVERSATION: uti symptoms    SYMPTOMS: burning;  odor;  darker urine;      OTHER: patient states that he and wife have been trying different sexual activities;      Urine labs ordered  Hydration / ER precautions reviewed      1. When did your symptoms start?   Burning with urination 4 days;  frequency;  odor;    2. Do you experience pain or burning with every void?   yes  3. Do you have any other urinary symptoms such as urinary frequency, urgency, incontinence, blood in your urine?   no  4. Do you have any abdominal pain or flank pain?  no  5. Do you have a fever of 101 or higher? How did you take your temperature?   no  6. Does your urine stream spray? (Specifically for males)   Good urine spray  7. Have you become unable to urinate?   no  8. Do you have a history of urinary tract infections?   no

## 2025-03-12 ENCOUNTER — APPOINTMENT (OUTPATIENT)
Dept: LAB | Facility: CLINIC | Age: 45
End: 2025-03-12
Payer: COMMERCIAL

## 2025-03-12 DIAGNOSIS — R39.9 UTI SYMPTOMS: ICD-10-CM

## 2025-03-12 LAB
BACTERIA UR QL AUTO: ABNORMAL /HPF
BILIRUB UR QL STRIP: NEGATIVE
CLARITY UR: CLEAR
COLOR UR: ABNORMAL
GLUCOSE UR STRIP-MCNC: NEGATIVE MG/DL
HGB UR QL STRIP.AUTO: NEGATIVE
KETONES UR STRIP-MCNC: NEGATIVE MG/DL
LEUKOCYTE ESTERASE UR QL STRIP: NEGATIVE
NITRITE UR QL STRIP: NEGATIVE
NON-SQ EPI CELLS URNS QL MICRO: ABNORMAL /HPF
PH UR STRIP.AUTO: 6.5 [PH]
PROT UR STRIP-MCNC: NEGATIVE MG/DL
RBC #/AREA URNS AUTO: ABNORMAL /HPF
SP GR UR STRIP.AUTO: 1.01 (ref 1–1.03)
UROBILINOGEN UR STRIP-ACNC: <2 MG/DL
WBC #/AREA URNS AUTO: ABNORMAL /HPF

## 2025-03-12 PROCEDURE — 81001 URINALYSIS AUTO W/SCOPE: CPT

## 2025-03-12 PROCEDURE — 87086 URINE CULTURE/COLONY COUNT: CPT

## 2025-03-13 ENCOUNTER — RESULTS FOLLOW-UP (OUTPATIENT)
Dept: UROLOGY | Facility: AMBULATORY SURGERY CENTER | Age: 45
End: 2025-03-13

## 2025-03-13 LAB — BACTERIA UR CULT: NORMAL

## 2025-07-10 ENCOUNTER — OFFICE VISIT (OUTPATIENT)
Dept: FAMILY MEDICINE CLINIC | Facility: CLINIC | Age: 45
End: 2025-07-10
Payer: COMMERCIAL

## 2025-07-10 VITALS
OXYGEN SATURATION: 95 % | WEIGHT: 209 LBS | HEIGHT: 70 IN | RESPIRATION RATE: 18 BRPM | BODY MASS INDEX: 29.92 KG/M2 | TEMPERATURE: 99 F | DIASTOLIC BLOOD PRESSURE: 70 MMHG | SYSTOLIC BLOOD PRESSURE: 120 MMHG | HEART RATE: 90 BPM

## 2025-07-10 DIAGNOSIS — R45.84 ANHEDONIA: ICD-10-CM

## 2025-07-10 DIAGNOSIS — J01.00 ACUTE NON-RECURRENT MAXILLARY SINUSITIS: ICD-10-CM

## 2025-07-10 DIAGNOSIS — H60.502 ACUTE OTITIS EXTERNA OF LEFT EAR, UNSPECIFIED TYPE: Primary | ICD-10-CM

## 2025-07-10 PROCEDURE — 99214 OFFICE O/P EST MOD 30 MIN: CPT | Performed by: PHYSICIAN ASSISTANT

## 2025-07-10 RX ORDER — OFLOXACIN 3 MG/ML
5 SOLUTION AURICULAR (OTIC) 2 TIMES DAILY
Qty: 5 ML | Refills: 0 | Status: SHIPPED | OUTPATIENT
Start: 2025-07-10

## 2025-07-10 NOTE — PROGRESS NOTES
Name: Vick Pappas      : 1980      MRN: [id number]  Encounter Provider: Bjorn Hanna PA-C  Encounter Date: 7/10/2025   Encounter department: [facility] PRIMARY [facility]  :  Assessment & Plan  Acute otitis externa of left ear, unspecified type  Patient does have swimmer's ear on the left side.  Would recommend starting on ofloxacin drops, 5 drops in the affected ear twice daily for 5 to 7 days.  Follow-up in the office if not improving.  Orders:  •  ofloxacin (FLOXIN) 0.3 % otic solution; Administer 5 drops into the left ear 2 (two) times a day    Acute non-recurrent maxillary sinusitis  Patient does have some visible pustule of the posterior pharynx.  He also has some erythema of the turbinates and would recommend treating with antibiotic therapy as well.  Will start Augmentin 8 has been 75 mg twice daily for 10 days.  Encourage patient to take this with food.  Orders:  •  amoxicillin-clavulanate (AUGMENTIN) 875-125 mg per tablet; Take 1 tablet by mouth every 12 (twelve) hours for 10 days    Anhedonia  Currently patient is satisfied with the way he feels and does not feel need to start another medication.  He was previously trialed on Wellbutrin without significant benefit.  We discussed possible seasonal affective symptoms and recommend starting vitamin D supplement, 2000 units daily.  Patient does not have any suicidal ideation or plan and he will let me know if he feels that he needs more help.             Depression Screening and Follow-up Plan: Patient's depression screening was positive with a PHQ-2 score of 3. Their PHQ-9 score was 7.   Clincally patient does not have depression. No treatment is required.     Tobacco Cessation Counseling: Tobacco cessation counseling was provided. The patient is sincerely urged to quit consumption of tobacco. He is not ready to quit tobacco.       History of Present Illness   HPI: This is a 44-year-old gentleman that presents to the office with concerns over  discomfort in his left ear.  This started in the past week after he was swimming quite a bit over the weekend.  He woke on Monday morning with the symptoms and they were quite severe and he was having difficulty hearing from the ear.  He started using some alcohol solution and it seemed to help a little bit but he never got completely better.  He also noticed today when he was looking in the mirror he had started with a bit of a sore throat and developed a pustule in the posterior right pharynx.  He had some concern that he had spreading infection and wanted to get evaluated.  He is not having any fevers that he is aware of.  He rates his pain in the ear as a 4 out of 10.  He also had depression screening today which was slightly positive.  Patient reports that he has had some issues with seasonal affective symptoms previously and was on Wellbutrin but never felt it was very helpful.  He denies any suicidal ideation or plan and states that he is currently content with the way things are and does not feel the need to start another medication.    Earache   There is pain in the left ear. This is a new problem. The current episode started in the past 7 days. The problem occurs constantly. The problem has been waxing and waning. There has been no fever. The fever has been present for Less than 1 day. The pain is at a severity of 4/10. Associated symptoms include coughing, hearing loss, neck pain and a sore throat.     Review of Systems   Constitutional:  Negative for fever.   HENT:  Positive for ear pain, hearing loss and sore throat. Negative for congestion.    Respiratory:  Positive for cough. Negative for chest tightness and shortness of breath.    Cardiovascular:  Negative for chest pain.   Musculoskeletal:  Positive for neck pain. Negative for arthralgias.       Objective   /70 (BP Location: Left arm, Patient Position: Sitting, Cuff Size: Adult)   Pulse 90   Temp 99 °F (37.2 °C) (Tympanic)   Resp 18   Ht  "5' 10\" (1.778 m)   Wt 94.8 kg (209 lb)   SpO2 95%   BMI 29.99 kg/m²      Physical Exam  Constitutional:       General: He is not in acute distress.     Appearance: He is well-developed.   HENT:      Head: Normocephalic and atraumatic.      Ears:      Comments: Bilateral tympanic membranes are clear however the left canal is erythematous and edematous.  There is a whitish-yellow pustule of the posterior right pharynx present.     Nose: Congestion present. No rhinorrhea.     Eyes:      Conjunctiva/sclera: Conjunctivae normal.       Cardiovascular:      Rate and Rhythm: Normal rate and regular rhythm.   Pulmonary:      Effort: Pulmonary effort is normal.   Abdominal:      General: Abdomen is flat. Bowel sounds are normal. There is no distension.      Palpations: Abdomen is soft. There is no mass.     Musculoskeletal:         General: Normal range of motion.      Cervical back: Normal range of motion.   Lymphadenopathy:      Cervical: No cervical adenopathy.     Skin:     General: Skin is warm.      Findings: No rash.     Neurological:      Mental Status: He is alert and oriented to person, place, and time.     Psychiatric:         Mood and Affect: Mood normal.         Behavior: Behavior normal.         Thought Content: Thought content normal.         Judgment: Judgment normal.         "

## (undated) DEVICE — TUBING SUCTION 5MM X 12 FT

## (undated) DEVICE — 10FR FRAZIER SUCTION HANDLE: Brand: CARDINAL HEALTH

## (undated) DEVICE — CHLORAPREP HI-LITE 26ML ORANGE

## (undated) DEVICE — PLUMEPEN PRO 10FT

## (undated) DEVICE — NEEDLE 22 G X 1 1/2 SAFETY

## (undated) DEVICE — GLOVE INDICATOR PI UNDERGLOVE SZ 8 BLUE

## (undated) DEVICE — GLOVE SRG BIOGEL 8

## (undated) DEVICE — BETHLEHEM UNIVERSAL MINOR GEN: Brand: CARDINAL HEALTH

## (undated) DEVICE — SPECIMEN CONTAINER STERILE PEEL PACK